# Patient Record
Sex: MALE | Race: WHITE | Employment: OTHER | URBAN - METROPOLITAN AREA
[De-identification: names, ages, dates, MRNs, and addresses within clinical notes are randomized per-mention and may not be internally consistent; named-entity substitution may affect disease eponyms.]

---

## 2018-10-03 ENCOUNTER — TELEPHONE (OUTPATIENT)
Dept: ONCOLOGY | Age: 83
End: 2018-10-03

## 2018-10-03 ENCOUNTER — HOSPITAL ENCOUNTER (OUTPATIENT)
Age: 83
Setting detail: SPECIMEN
Discharge: HOME | End: 2018-10-03
Payer: MEDICARE

## 2018-10-03 ENCOUNTER — HOSPITAL ENCOUNTER (OUTPATIENT)
Dept: INFUSION THERAPY | Age: 83
Discharge: HOME | End: 2018-10-03
Payer: MEDICARE

## 2018-10-03 ENCOUNTER — OFFICE VISIT (OUTPATIENT)
Dept: ONCOLOGY | Age: 83
End: 2018-10-03
Payer: MEDICARE

## 2018-10-03 VITALS
SYSTOLIC BLOOD PRESSURE: 166 MMHG | HEART RATE: 60 BPM | DIASTOLIC BLOOD PRESSURE: 91 MMHG | BODY MASS INDEX: 27.25 KG/M2 | TEMPERATURE: 98.4 F | WEIGHT: 173.6 LBS | RESPIRATION RATE: 20 BRPM | HEIGHT: 67 IN

## 2018-10-03 DIAGNOSIS — C61 PROSTATE CANCER (HCC): Primary | ICD-10-CM

## 2018-10-03 LAB
ALBUMIN SERPL-MCNC: 4.2 G/DL (ref 3.5–5.2)
ALP BLD-CCNC: 77 U/L (ref 40–129)
ALT SERPL-CCNC: 14 U/L (ref 0–40)
ANION GAP SERPL CALCULATED.3IONS-SCNC: 11 MMOL/L (ref 7–16)
AST SERPL-CCNC: 25 U/L (ref 0–39)
BACTERIA: NORMAL /HPF
BASOPHILS ABSOLUTE: 0.04 E9/L (ref 0–0.2)
BASOPHILS RELATIVE PERCENT: 0.7 % (ref 0–2)
BILIRUB SERPL-MCNC: 0.4 MG/DL (ref 0–1.2)
BILIRUBIN URINE: NEGATIVE
BLOOD, URINE: ABNORMAL
BUN BLDV-MCNC: 12 MG/DL (ref 8–23)
CALCIUM SERPL-MCNC: 9.6 MG/DL (ref 8.6–10.2)
CHLORIDE BLD-SCNC: 106 MMOL/L (ref 98–107)
CLARITY: CLEAR
CO2: 27 MMOL/L (ref 22–29)
COLOR: YELLOW
CREAT SERPL-MCNC: 0.9 MG/DL (ref 0.7–1.2)
EOSINOPHILS ABSOLUTE: 0.06 E9/L (ref 0.05–0.5)
EOSINOPHILS RELATIVE PERCENT: 1 % (ref 0–6)
EPITHELIAL CELLS, UA: NORMAL /HPF
GFR AFRICAN AMERICAN: >60
GFR NON-AFRICAN AMERICAN: >60 ML/MIN/1.73
GLUCOSE BLD-MCNC: 95 MG/DL (ref 74–109)
GLUCOSE URINE: NEGATIVE MG/DL
HCT VFR BLD CALC: 45.5 % (ref 37–54)
HEMOGLOBIN: 14.5 G/DL (ref 12.5–16.5)
IMMATURE GRANULOCYTES #: 0.01 E9/L
IMMATURE GRANULOCYTES %: 0.2 % (ref 0–5)
KETONES, URINE: NEGATIVE MG/DL
LEUKOCYTE ESTERASE, URINE: NEGATIVE
LYMPHOCYTES ABSOLUTE: 1.45 E9/L (ref 1.5–4)
LYMPHOCYTES RELATIVE PERCENT: 24.7 % (ref 20–42)
MCH RBC QN AUTO: 28.7 PG (ref 26–35)
MCHC RBC AUTO-ENTMCNC: 31.9 % (ref 32–34.5)
MCV RBC AUTO: 89.9 FL (ref 80–99.9)
MONOCYTES ABSOLUTE: 0.45 E9/L (ref 0.1–0.95)
MONOCYTES RELATIVE PERCENT: 7.7 % (ref 2–12)
NEUTROPHILS ABSOLUTE: 3.85 E9/L (ref 1.8–7.3)
NEUTROPHILS RELATIVE PERCENT: 65.7 % (ref 43–80)
NITRITE, URINE: NEGATIVE
PDW BLD-RTO: 13 FL (ref 11.5–15)
PH UA: 6 (ref 5–9)
PLATELET # BLD: 162 E9/L (ref 130–450)
PMV BLD AUTO: 10.6 FL (ref 7–12)
POTASSIUM SERPL-SCNC: 5.1 MMOL/L (ref 3.5–5)
PROSTATE SPECIFIC ANTIGEN: <0.01 NG/ML (ref 0–4)
PROTEIN UA: NEGATIVE MG/DL
RBC # BLD: 5.06 E12/L (ref 3.8–5.8)
RBC UA: NORMAL /HPF (ref 0–2)
SODIUM BLD-SCNC: 144 MMOL/L (ref 132–146)
SPECIFIC GRAVITY UA: 1.02 (ref 1–1.03)
TOTAL PROTEIN: 7.1 G/DL (ref 6.4–8.3)
UROBILINOGEN, URINE: 0.2 E.U./DL
WBC # BLD: 5.9 E9/L (ref 4.5–11.5)
WBC UA: NORMAL /HPF (ref 0–5)

## 2018-10-03 PROCEDURE — 36415 COLL VENOUS BLD VENIPUNCTURE: CPT

## 2018-10-03 PROCEDURE — 85025 COMPLETE CBC W/AUTO DIFF WBC: CPT

## 2018-10-03 PROCEDURE — 87088 URINE BACTERIA CULTURE: CPT

## 2018-10-03 PROCEDURE — 84153 ASSAY OF PSA TOTAL: CPT

## 2018-10-03 PROCEDURE — 81001 URINALYSIS AUTO W/SCOPE: CPT

## 2018-10-03 PROCEDURE — 80053 COMPREHEN METABOLIC PANEL: CPT

## 2018-10-03 PROCEDURE — 99214 OFFICE O/P EST MOD 30 MIN: CPT | Performed by: INTERNAL MEDICINE

## 2018-10-03 RX ORDER — PNV NO.95/FERROUS FUM/FOLIC AC 28MG-0.8MG
1 TABLET ORAL DAILY
COMMUNITY
End: 2021-02-02

## 2018-10-03 NOTE — PROGRESS NOTES
Met with patient during his initial consult with Dr. Jack Keita for a history of prostate cancer from 2003. Introduced nurse navigator role, gave contact information and informed of other supportive services in clinic: , nutrition, ACS patient navigator and financial navigator. Patient is , is the caregiver for his wife, is supported by his children and denied issues with living arrangements or transportation. He said he has [employer], will bring his card with him at his next appointment and denied insurance and prescription coverage. He reported his appetite as good, denying weight loss. Informed him he'll receive his clinic calendar in the mail and will see Dr. Jack Keita at the beginning of November and will be contacted with an appointment for his renal ultrasound. Patient denied any needs today, encouraged him to call should any arise, he verbalized understanding. Radha Pimentel RN, ADN, BSE, OCN Patient Nurse Navigator

## 2018-10-03 NOTE — PROGRESS NOTES
[address]  [address] [place] [address]   Hematology/Oncology  Consult      Patient Name: Esequiel Suarez  YOB: 1935  PCP: Susannah Rebolledo MD   Referring Provider:      Reason for Consultation:   Chief Complaint   Patient presents with    New Patient    Prostate Cancer        History of Present Illness: This patient was referred to Dr. Erik Bloom by Dr. Jeanie Liriano for follow-up for his prostate cancer. He was diagnosed in 2003 with prostate cancer and underwent Radiation seeds at [facility].  He follows annually with Dr. Jeanie Liriano for PSAs;  his last PSA done on 7/27/18 was < 0.014. He had been followed previously by Dr. Girish Partida and more recently by Dr. Hieu Tyler back in 2013    He states that on a recent urinalysis he was noted to have microscopic hematuria. He is fairly asymptomatic and feels fine and he reports nocturia but has no other urinary symptoms. No flank pain or weight loss. No back pain. He takes monoclonal antibody for psoriasis which has helped him but is not taking any blood thinners      Diagnostic Data:     Past Medical History:   Diagnosis Date    Cancer [facility]) 2003    prostate       There are no active problems to display for this patient. History reviewed. No pertinent surgical history. Family History  History reviewed. No pertinent family history. Social History    TOBACCO:   has no tobacco history on file. ETOH:   has no alcohol history on file. Home Medications  Prior to Admission medications    Medication Sig Start Date End Date Taking?  Authorizing Provider   Omeprazole (PRILOSEC PO) Take 1 capsule by mouth daily as needed   Yes Historical Provider, MD   Cyanocobalamin (VITAMIN B 12 PO) Take 1 tablet by mouth daily   Yes Historical Provider, MD   Ferrous Sulfate (IRON) 325 (65 Fe) MG TABS Take 1 tablet by mouth daily   Yes Historical Provider, MD   Omega-3 Fatty Acids (OMEGA-3 FISH OIL PO) Take 1 capsule by mouth daily   Yes Historical Provider, MD   Ustekinumab (STELARA SC) Inject 1 Syringe into the skin every 3 months   Yes Historical Provider, MD       Allergies  No Known Allergies    Review of Systems:    Constitutional:  No fever chills or rigors. Eyes: No changes in vision, discharge, or pain  ENT: No Headaches, hearing loss or vertigo. No mouth sores or sore throat. No change in taste or smell. Cardiovascular: No chest discomfort, dyspnea on exertion, palpitations or loss of consciousness. or phlebitis. Respiratory: Has no cough or wheezing, Has no sputum production. Has no hemoptysis, Has no pleuritic pain, . Gastrointestinal: No abdominal pain, appetite loss, blood in stools. No change in bowel habits. No hematemesis   Genitourinary: Patient acknowledges no dysuria, trouble voiding, or hematuria. No nocturia or increased frequency. Musculoskeletal: No gait disturbance, weakness or joint complaints. Integumentary: No rash or pruritis. Neurological: No headache, diplopia, change in muscle strength, numbness or tingling. No change in gait, balance, coordination, mood, affect, memory, mentation, behavior. Psychiatric: No anxiety, or depression. Endocrine: No temperature intolerance. No excessive thirst, fluid intake, or urination. No tremor. Hematologic/Lymphatic: No abnormal bruising or bleeding, blood clots or swollen lymph nodes. Allergic/Immunologic: No nasal congestion or hives. Objective  BP (!) 166/91 (Site: Left Upper Arm, Position: Sitting, Cuff Size: Medium Adult) Comment: Machine pumped twice. Pulse 60   Temp 98.4 °F (36.9 °C) (Temporal)   Resp 20   Ht 5' 7\" (1.702 m)   Wt 173 lb 9.6 oz (78.7 kg)   BMI 27.19 kg/m²   Physical Exam:   Performance Status: 0  General: AAO to person, place, time, in no acute distress, pleasant   Head and neck : PERRLA, EOMI . Sclera non icteric.   Oropharynx : Clear  Neck: no JVD,  no adenopathy,     Heart: Regular rate and regular rhythm, no murmur  Lungs: Clear to auscultation   Extremities: No edema,no cyanosis, no clubbing. Abdomen: Soft, non-tender;no masses, no organomegaly  Skin:  Well-healed psoriatic rash  Neurologic:Cranial nerves grossly intact. No focal motor or sensory deficits . Recent Laboratory Data-   Lab Results   Component Value Date    WBC 5.9 10/03/2018    HGB 14.5 10/03/2018    HCT 45.5 10/03/2018    MCV 89.9 10/03/2018     10/03/2018    LYMPHOPCT 24.7 10/03/2018    RBC 5.06 10/03/2018    MCH 28.7 10/03/2018    MCHC 31.9 (L) 10/03/2018    RDW 13.0 10/03/2018    NEUTOPHILPCT 65.7 10/03/2018    MONOPCT 7.7 10/03/2018    BASOPCT 0.7 10/03/2018    NEUTROABS 3.85 10/03/2018    LYMPHSABS 1.45 (L) 10/03/2018    MONOSABS 0.45 10/03/2018    EOSABS 0.06 10/03/2018    BASOSABS 0.04 10/03/2018       Lab Results   Component Value Date     10/03/2018    K 5.1 (H) 10/03/2018     10/03/2018    CO2 27 10/03/2018    BUN 12 10/03/2018    CREATININE 0.9 10/03/2018    GLUCOSE 95 10/03/2018    CALCIUM 9.6 10/03/2018    PROT 7.1 10/03/2018    LABALBU 4.2 10/03/2018    BILITOT 0.4 10/03/2018    ALKPHOS 77 10/03/2018    AST 25 10/03/2018    ALT 14 10/03/2018    LABGLOM >60 10/03/2018    GFRAA >60 10/03/2018       Lab Results   Component Value Date    IRON 98 03/04/2015    TIBC 368 03/25/2013    FERRITIN 46 03/04/2015           Radiology-  No results found. ASSESSMENT/PLAN :  49-year-old man with a remote history of early stage prostate cancer status post radiation seeds at [facility] in 2003 his PSA has remained very low and close to undetectable. Apparently he was noted to have microscopic hematuria. He was last seen by urology in 2013    CBC CMP and PSA will be repeated today along with a urinalysis and urine culture and ultrasound of kidneys abdomen and retroperitoneum will be reviewed      Sonya Rosales. Viral Oliveros M.D., F.A.C.P.   Electronically signed 10/3/2018 at 1:53 PM

## 2018-10-03 NOTE — PROGRESS NOTES
Nancy Lance  1935 80 y.o. Referring Physician:     Samuel Almaraz MD :    Vitals:    10/03/18 1156   BP: (!) 166/91   Pulse: 60   Resp: 20   Temp: 98.4 °F (36.9 °C)    :     :    Body mass index is 27.19 kg/m². Chief Complaint:   Chief Complaint   Patient presents with    New Patient    Prostate Cancer       Cancer Staging  No matching staging information was found for the patient. Prior Radiation Therapy? yes   If yes, site treated: prostate  [facility]                       Date:    Concurrent Chemo/radiation? no   If yes, start date:    Prior Chemotherapy? no   If yes, site treated:   Facility:                             Date:    Prior Hormonal Therapy? Yes, patient stated he received only 1 injection, but then was told he didn't need them anymore. If yes, site treated:   Facility:   Critical access hospital [facility]                          FBWS:     LMP:na    Age at first Menses: na    :na    Para:na              Current Outpatient Prescriptions:     Omeprazole (PRILOSEC PO), Take 1 capsule by mouth daily as needed, Disp: , Rfl:     Cyanocobalamin (VITAMIN B 12 PO), Take 1 tablet by mouth daily, Disp: , Rfl:     Ferrous Sulfate (IRON) 325 (65 Fe) MG TABS, Take 1 tablet by mouth daily, Disp: , Rfl:     Omega-3 Fatty Acids (OMEGA-3 FISH OIL PO), Take 1 capsule by mouth daily, Disp: , Rfl:     Ustekinumab (STELARA SC), Inject 1 Syringe into the skin every 3 months, Disp: , Rfl:        Past Medical History:   Diagnosis Date    [facility]     [facility]       History reviewed. No pertinent surgical history. History reviewed. No pertinent family history. Social History     Social History    Marital status:      Spouse name: N/A    Number of children: N/A    Years of education: N/A     Occupational History    Not on file.      Social History Main Topics    Smoking status: Not on file    Smokeless tobacco: Not on file    Alcohol use Not on malnutrition                  >/=2 at risk of malnutrition, see below           Score of 0-1: No action  Score 2 or greater:  1. For Non-Diabetic Patient: Recommend adding Ensure Complete 2xdaily and provide              patient with Ensure wellness bag with coupons; For Diabetic Patient, Recommend adding Glucerna Shake 2xdaily and provide patient with Glucerna Wellness bag with coupons  Route to the dietitian via [address] [place] [address]    · Are you having  difficulty performing daily routine tasks  due to fatigue or weakness (ie: bathing/showering, dressing, housework, meal prep, work, child Víctor Pilsner): no    · Do you have any arm flexibility/ROM restrictions, swelling or pain that limit activity:no    · Any changes in memory, attention/focus that impact daily activities:  no    · Do you avoid participation in leisure/social activity due weakness, fatigue or pain:no    IF ANY OF THE ABOVE ARE ANSWERED YES:   a. Referral request for OT sent to NP? No   · If NO, then why: patient doesn't meet criteria   b. NP Name:na        PT ASSESSMENT FOR REFERRAL    · Have you had any recent falls in past 2 months:no    · Do you have difficulty  going up/down stairs:no    · Are you having difficulty walking: no    · Do you often hold onto furniture/environmental supports or feel off balance when you are walking: no    · Do you need to take rest breaks when you are walking: no    · Any pain on scale of 1-10 that limits your mobility:no, 0/10    IF ANY OF THE ABOVE ARE ANSWERED YES:   a. Referral request for PT sent to NP? no  · If NO, then why: patient doesn't meet criteria   b. NP Name:       Distress Screening Assessment   1. Completed by the patient? Form not available for patient today  2. Reviewed by RN? na   3. Triggers met for immediate intervention (score of 6 or more)? na, patient denied having any needs listed on distress screening. If Yes: a. What intervention provided: na    b.  Referral made to

## 2018-10-05 LAB — URINE CULTURE, ROUTINE: NORMAL

## 2018-11-05 ENCOUNTER — HOSPITAL ENCOUNTER (OUTPATIENT)
Dept: ULTRASOUND IMAGING | Age: 83
Discharge: HOME | End: 2018-11-05
Payer: MEDICARE

## 2018-11-05 DIAGNOSIS — C61 PROSTATE CANCER (HCC): ICD-10-CM

## 2018-11-05 PROCEDURE — 76775 US EXAM ABDO BACK WALL LIM: CPT

## 2018-11-07 ENCOUNTER — HOSPITAL ENCOUNTER (OUTPATIENT)
Dept: INFUSION THERAPY | Age: 83
Discharge: HOME | End: 2018-11-07
Payer: MEDICARE

## 2018-11-07 ENCOUNTER — OFFICE VISIT (OUTPATIENT)
Dept: ONCOLOGY | Age: 83
End: 2018-11-07
Payer: MEDICARE

## 2018-11-07 VITALS
DIASTOLIC BLOOD PRESSURE: 109 MMHG | TEMPERATURE: 98.2 F | WEIGHT: 173.5 LBS | SYSTOLIC BLOOD PRESSURE: 172 MMHG | RESPIRATION RATE: 20 BRPM | HEIGHT: 67 IN | BODY MASS INDEX: 27.23 KG/M2

## 2018-11-07 DIAGNOSIS — C61 PROSTATE CANCER (HCC): ICD-10-CM

## 2018-11-07 PROCEDURE — 99212 OFFICE O/P EST SF 10 MIN: CPT

## 2018-11-07 NOTE — PROGRESS NOTES
RBCs  Urine culture was negative. Ultrasound of the kidneys showed benign-appearing renal cysts. with very mild hydronephrosis  PSA was less than 0.01    His mild left-sided hydronephrosis is likely related to postradiation scarring. He has been reassured. Dwayne Honeycutt. Sushila Ivory M.D., F.A.C.P.   Electronically signed 11/7/2018 at 1:01 PM

## 2019-07-03 LAB
AVERAGE GLUCOSE: 108
CHOLESTEROL, TOTAL: 164 MG/DL
CHOLESTEROL/HDL RATIO: 2.5
CREATININE: 1 MG/DL
HBA1C MFR BLD: 5.4 %
HDLC SERPL-MCNC: 66 MG/DL (ref 35–70)
LDL CHOLESTEROL CALCULATED: 85 MG/DL (ref 0–160)
POTASSIUM (K+): 4.9
PSA, ULTRASENSITIVE: <0.01
TRIGL SERPL-MCNC: 66 MG/DL
VLDLC SERPL CALC-MCNC: 13 MG/DL

## 2019-08-27 ENCOUNTER — HOSPITAL ENCOUNTER (OUTPATIENT)
Dept: GENERAL RADIOLOGY | Age: 84
Discharge: HOME | End: 2019-08-29
Payer: MEDICARE

## 2019-08-27 ENCOUNTER — HOSPITAL ENCOUNTER (OUTPATIENT)
Age: 84
Discharge: HOME | End: 2019-08-29
Payer: MEDICARE

## 2019-08-27 DIAGNOSIS — R52 PAIN: ICD-10-CM

## 2019-08-27 PROCEDURE — 73130 X-RAY EXAM OF HAND: CPT

## 2019-11-06 ENCOUNTER — OFFICE VISIT (OUTPATIENT)
Dept: ONCOLOGY | Age: 84
End: 2019-11-06
Payer: MEDICARE

## 2019-11-06 ENCOUNTER — HOSPITAL ENCOUNTER (OUTPATIENT)
Dept: INFUSION THERAPY | Age: 84
Discharge: HOME | End: 2019-11-06
Payer: MEDICARE

## 2019-11-06 VITALS
OXYGEN SATURATION: 96 % | HEART RATE: 86 BPM | SYSTOLIC BLOOD PRESSURE: 135 MMHG | HEIGHT: 67 IN | WEIGHT: 167 LBS | DIASTOLIC BLOOD PRESSURE: 99 MMHG | TEMPERATURE: 97.6 F | BODY MASS INDEX: 26.21 KG/M2

## 2019-11-06 DIAGNOSIS — C61 PROSTATE CANCER (HCC): Primary | ICD-10-CM

## 2019-11-06 DIAGNOSIS — C61 PROSTATE CANCER (HCC): ICD-10-CM

## 2019-11-06 LAB
ALBUMIN SERPL-MCNC: 4 G/DL (ref 3.5–5.2)
ALP BLD-CCNC: 90 U/L (ref 40–129)
ALT SERPL-CCNC: 10 U/L (ref 0–40)
ANION GAP SERPL CALCULATED.3IONS-SCNC: 10 MMOL/L (ref 7–16)
AST SERPL-CCNC: 20 U/L (ref 0–39)
BACTERIA: NORMAL /HPF
BASOPHILS ABSOLUTE: 0.05 E9/L (ref 0–0.2)
BASOPHILS RELATIVE PERCENT: 0.7 % (ref 0–2)
BILIRUB SERPL-MCNC: 0.5 MG/DL (ref 0–1.2)
BILIRUBIN URINE: NEGATIVE
BLOOD, URINE: ABNORMAL
BUN BLDV-MCNC: 12 MG/DL (ref 8–23)
CALCIUM SERPL-MCNC: 9.4 MG/DL (ref 8.6–10.2)
CHLORIDE BLD-SCNC: 104 MMOL/L (ref 98–107)
CLARITY: CLEAR
CO2: 26 MMOL/L (ref 22–29)
COLOR: YELLOW
CREAT SERPL-MCNC: 1.1 MG/DL (ref 0.7–1.2)
EOSINOPHILS ABSOLUTE: 0.05 E9/L (ref 0.05–0.5)
EOSINOPHILS RELATIVE PERCENT: 0.7 % (ref 0–6)
EPITHELIAL CELLS, UA: NORMAL /HPF
GFR AFRICAN AMERICAN: >60
GFR NON-AFRICAN AMERICAN: >60 ML/MIN/1.73
GLUCOSE BLD-MCNC: 92 MG/DL (ref 74–99)
GLUCOSE URINE: NEGATIVE MG/DL
HCT VFR BLD CALC: 39.9 % (ref 37–54)
HEMOGLOBIN: 12.9 G/DL (ref 12.5–16.5)
IMMATURE GRANULOCYTES #: 0.02 E9/L
IMMATURE GRANULOCYTES %: 0.3 % (ref 0–5)
KETONES, URINE: NEGATIVE MG/DL
LEUKOCYTE ESTERASE, URINE: NEGATIVE
LYMPHOCYTES ABSOLUTE: 1.03 E9/L (ref 1.5–4)
LYMPHOCYTES RELATIVE PERCENT: 15.4 % (ref 20–42)
MCH RBC QN AUTO: 29.2 PG (ref 26–35)
MCHC RBC AUTO-ENTMCNC: 32.3 % (ref 32–34.5)
MCV RBC AUTO: 90.3 FL (ref 80–99.9)
MONOCYTES ABSOLUTE: 0.5 E9/L (ref 0.1–0.95)
MONOCYTES RELATIVE PERCENT: 7.5 % (ref 2–12)
NEUTROPHILS ABSOLUTE: 5.03 E9/L (ref 1.8–7.3)
NEUTROPHILS RELATIVE PERCENT: 75.4 % (ref 43–80)
NITRITE, URINE: NEGATIVE
PDW BLD-RTO: 13.1 FL (ref 11.5–15)
PH UA: 6 (ref 5–9)
PLATELET # BLD: 181 E9/L (ref 130–450)
PMV BLD AUTO: 10.3 FL (ref 7–12)
POTASSIUM SERPL-SCNC: 4 MMOL/L (ref 3.5–5)
PROSTATE SPECIFIC ANTIGEN: <0.01 NG/ML (ref 0–4)
PROTEIN UA: NEGATIVE MG/DL
RBC # BLD: 4.42 E12/L (ref 3.8–5.8)
RBC UA: NORMAL /HPF (ref 0–2)
SODIUM BLD-SCNC: 140 MMOL/L (ref 132–146)
SPECIFIC GRAVITY UA: 1.01 (ref 1–1.03)
TOTAL PROTEIN: 6.9 G/DL (ref 6.4–8.3)
UROBILINOGEN, URINE: 0.2 E.U./DL
WBC # BLD: 6.7 E9/L (ref 4.5–11.5)
WBC UA: NORMAL /HPF (ref 0–5)

## 2019-11-06 PROCEDURE — 36415 COLL VENOUS BLD VENIPUNCTURE: CPT

## 2019-11-06 PROCEDURE — 84153 ASSAY OF PSA TOTAL: CPT

## 2019-11-06 PROCEDURE — 85025 COMPLETE CBC W/AUTO DIFF WBC: CPT

## 2019-11-06 PROCEDURE — 99212 OFFICE O/P EST SF 10 MIN: CPT | Performed by: INTERNAL MEDICINE

## 2019-11-06 PROCEDURE — 81001 URINALYSIS AUTO W/SCOPE: CPT

## 2019-11-06 PROCEDURE — 80053 COMPREHEN METABOLIC PANEL: CPT

## 2020-06-03 VITALS
SYSTOLIC BLOOD PRESSURE: 120 MMHG | RESPIRATION RATE: 14 BRPM | HEART RATE: 62 BPM | WEIGHT: 165 LBS | BODY MASS INDEX: 25.84 KG/M2 | DIASTOLIC BLOOD PRESSURE: 80 MMHG

## 2020-06-03 RX ORDER — OMEPRAZOLE 20 MG/1
20 CAPSULE, DELAYED RELEASE ORAL DAILY
COMMUNITY
End: 2021-02-02

## 2020-06-03 RX ORDER — OXYBUTYNIN CHLORIDE 5 MG/1
5 TABLET, EXTENDED RELEASE ORAL DAILY
COMMUNITY
End: 2021-02-02 | Stop reason: SDUPTHER

## 2020-06-03 RX ORDER — ERGOCALCIFEROL 1.25 MG/1
50000 CAPSULE ORAL WEEKLY
COMMUNITY
End: 2021-09-14

## 2020-06-09 LAB
AVERAGE GLUCOSE: 111
CHOLESTEROL, TOTAL: 162 MG/DL
CHOLESTEROL/HDL RATIO: 2
CREATININE: 0.9 MG/DL
HBA1C MFR BLD: 5.5 %
HDLC SERPL-MCNC: 85 MG/DL (ref 35–70)
LDL CHOLESTEROL CALCULATED: 66 MG/DL (ref 0–160)
POTASSIUM (K+): 4.3
TRIGL SERPL-MCNC: 54 MG/DL
VLDLC SERPL CALC-MCNC: ABNORMAL MG/DL

## 2021-02-02 ENCOUNTER — OFFICE VISIT (OUTPATIENT)
Dept: FAMILY MEDICINE CLINIC | Age: 86
End: 2021-02-02
Payer: MEDICARE

## 2021-02-02 VITALS
OXYGEN SATURATION: 98 % | BODY MASS INDEX: 31.1 KG/M2 | WEIGHT: 169 LBS | HEART RATE: 98 BPM | SYSTOLIC BLOOD PRESSURE: 126 MMHG | DIASTOLIC BLOOD PRESSURE: 80 MMHG | TEMPERATURE: 97 F | HEIGHT: 62 IN

## 2021-02-02 DIAGNOSIS — M19.90 ARTHRITIS: ICD-10-CM

## 2021-02-02 DIAGNOSIS — C61 PROSTATE CANCER (HCC): ICD-10-CM

## 2021-02-02 DIAGNOSIS — E78.5 HYPERLIPIDEMIA, UNSPECIFIED HYPERLIPIDEMIA TYPE: ICD-10-CM

## 2021-02-02 DIAGNOSIS — L40.9 PSORIASIS: ICD-10-CM

## 2021-02-02 DIAGNOSIS — L40.9 PSORIASIS: Primary | ICD-10-CM

## 2021-02-02 DIAGNOSIS — D51.0 PERNICIOUS ANEMIA: ICD-10-CM

## 2021-02-02 LAB
ALBUMIN SERPL-MCNC: 4.3 G/DL (ref 3.5–5.2)
ALP BLD-CCNC: 81 U/L (ref 40–129)
ALT SERPL-CCNC: 9 U/L (ref 0–40)
ANION GAP SERPL CALCULATED.3IONS-SCNC: 14 MMOL/L (ref 7–16)
AST SERPL-CCNC: 17 U/L (ref 0–39)
BASOPHILS ABSOLUTE: 0.02 E9/L (ref 0–0.2)
BASOPHILS RELATIVE PERCENT: 0.2 % (ref 0–2)
BILIRUB SERPL-MCNC: 0.5 MG/DL (ref 0–1.2)
BUN BLDV-MCNC: 10 MG/DL (ref 8–23)
BURR CELLS: ABNORMAL
CALCIUM SERPL-MCNC: 9.6 MG/DL (ref 8.6–10.2)
CHLORIDE BLD-SCNC: 102 MMOL/L (ref 98–107)
CHOLESTEROL, TOTAL: 175 MG/DL (ref 0–199)
CO2: 24 MMOL/L (ref 22–29)
CREAT SERPL-MCNC: 0.8 MG/DL (ref 0.7–1.2)
EOSINOPHILS ABSOLUTE: 0 E9/L (ref 0.05–0.5)
EOSINOPHILS RELATIVE PERCENT: 0 % (ref 0–6)
GFR AFRICAN AMERICAN: >60
GFR NON-AFRICAN AMERICAN: >60 ML/MIN/1.73
GLUCOSE BLD-MCNC: 127 MG/DL (ref 74–99)
HCT VFR BLD CALC: 41.1 % (ref 37–54)
HDLC SERPL-MCNC: 88 MG/DL
HEMOGLOBIN: 13.1 G/DL (ref 12.5–16.5)
IMMATURE GRANULOCYTES #: 0.01 E9/L
IMMATURE GRANULOCYTES %: 0.1 % (ref 0–5)
LDL CHOLESTEROL CALCULATED: 74 MG/DL (ref 0–99)
LYMPHOCYTES ABSOLUTE: 0.49 E9/L (ref 1.5–4)
LYMPHOCYTES RELATIVE PERCENT: 6.1 % (ref 20–42)
MCH RBC QN AUTO: 27.5 PG (ref 26–35)
MCHC RBC AUTO-ENTMCNC: 31.9 % (ref 32–34.5)
MCV RBC AUTO: 86.2 FL (ref 80–99.9)
MONOCYTES ABSOLUTE: 0.24 E9/L (ref 0.1–0.95)
MONOCYTES RELATIVE PERCENT: 3 % (ref 2–12)
NEUTROPHILS ABSOLUTE: 7.28 E9/L (ref 1.8–7.3)
NEUTROPHILS RELATIVE PERCENT: 90.6 % (ref 43–80)
OVALOCYTES: ABNORMAL
PDW BLD-RTO: 14 FL (ref 11.5–15)
PLATELET # BLD: 247 E9/L (ref 130–450)
PMV BLD AUTO: 10.3 FL (ref 7–12)
POIKILOCYTES: ABNORMAL
POLYCHROMASIA: ABNORMAL
POTASSIUM SERPL-SCNC: 4.5 MMOL/L (ref 3.5–5)
PROSTATE SPECIFIC ANTIGEN: <0.03 NG/ML (ref 0–4)
RBC # BLD: 4.77 E12/L (ref 3.8–5.8)
SODIUM BLD-SCNC: 140 MMOL/L (ref 132–146)
TOTAL PROTEIN: 7.1 G/DL (ref 6.4–8.3)
TRIGL SERPL-MCNC: 65 MG/DL (ref 0–149)
TSH SERPL DL<=0.05 MIU/L-ACNC: 0.92 UIU/ML (ref 0.27–4.2)
VLDLC SERPL CALC-MCNC: 13 MG/DL
WBC # BLD: 8 E9/L (ref 4.5–11.5)

## 2021-02-02 PROCEDURE — 99213 OFFICE O/P EST LOW 20 MIN: CPT | Performed by: INTERNAL MEDICINE

## 2021-02-02 RX ORDER — OXYBUTYNIN CHLORIDE 5 MG/1
5 TABLET, EXTENDED RELEASE ORAL DAILY
Qty: 90 TABLET | Refills: 1 | Status: SHIPPED
Start: 2021-02-02 | End: 2021-05-13

## 2021-02-02 SDOH — ECONOMIC STABILITY: FOOD INSECURITY: WITHIN THE PAST 12 MONTHS, THE FOOD YOU BOUGHT JUST DIDN'T LAST AND YOU DIDN'T HAVE MONEY TO GET MORE.: NEVER TRUE

## 2021-02-02 SDOH — ECONOMIC STABILITY: INCOME INSECURITY: HOW HARD IS IT FOR YOU TO PAY FOR THE VERY BASICS LIKE FOOD, HOUSING, MEDICAL CARE, AND HEATING?: NOT ASKED

## 2021-02-02 SDOH — ECONOMIC STABILITY: FOOD INSECURITY: WITHIN THE PAST 12 MONTHS, YOU WORRIED THAT YOUR FOOD WOULD RUN OUT BEFORE YOU GOT MONEY TO BUY MORE.: NEVER TRUE

## 2021-02-02 ASSESSMENT — PATIENT HEALTH QUESTIONNAIRE - PHQ9
SUM OF ALL RESPONSES TO PHQ QUESTIONS 1-9: 0
SUM OF ALL RESPONSES TO PHQ QUESTIONS 1-9: 0
1. LITTLE INTEREST OR PLEASURE IN DOING THINGS: 0
SUM OF ALL RESPONSES TO PHQ QUESTIONS 1-9: 0

## 2021-02-02 NOTE — PROGRESS NOTES
Subjective:     Chief Complaint   Patient presents with    Joint Pain   Complains of joint pains symptoms the hands swell up patient has psoriatic arthritis disease flares up he takes some prednisone which helps    Has history of prostate cancer has not seen a urologist    Has history of pernicious anemia      Follow-up on the cholesterol    Past Medical History:   Diagnosis Date    [facility]    prostate-post seed implants 2003    Diverticulosis     GERD (gastroesophageal reflux disease)     Hemorrhoids     Hiatal hernia     Hyperlipidemia     Osteoarthritis     Psoriasis         Social History     Socioeconomic History    Marital status:      Spouse name: Not on file    Number of children: Not on file    Years of education: Not on file    Highest education level: Not on file   Occupational History    Not on file   Social Needs    Financial resource strain: Not on file    Food insecurity     Worry: Never true     Inability: Never true    Transportation needs     Medical: No     Non-medical: No   Tobacco Use    Smoking status: Former Smoker     Packs/day: 0.25     Years: 5.00     Pack years: 1.25     Types: Cigarettes     Quit date: 2000     Years since quittin.1    Smokeless tobacco: Never Used   Substance and Sexual Activity    Alcohol use:  Yes     Alcohol/week: 21.0 standard drinks     Types: 21 Cans of beer per week    Drug use: No    Sexual activity: Not on file   Lifestyle    Physical activity     Days per week: Not on file     Minutes per session: Not on file    Stress: Not on file   Relationships    Social connections     Talks on phone: Not on file     Gets together: Not on file     Attends [employer] service: Not on file     Active member of club or organization: Not on file     Attends meetings of clubs or organizations: Not on file     Relationship status: Not on file    Intimate partner violence     Fear of current or ex partner: Not on file Emotionally abused: Not on file     Physically abused: Not on file     Forced sexual activity: Not on file   Other Topics Concern    Not on file   Social History Narrative    Not on file        Past Surgical History:   Procedure Laterality Date    CARDIAC CATHETERIZATION  03/2007    COLONOSCOPY  08/2011        Family History   Problem Relation Age of Onset    No Known Problems Mother     Heart Attack Father     Heart Disease Father     No Known Problems Sister     No Known Problems Brother     No Known Problems Maternal Aunt     Heart Attack Maternal Uncle     No Known Problems Paternal Aunt     No Known Problems Paternal Uncle     No Known Problems Maternal Grandmother     No Known Problems Maternal Grandfather     No Known Problems Paternal Grandmother     No Known Problems Paternal Grandfather     No Known Problems Maternal Cousin     No Known Problems Paternal Cousin     Diabetes Grandchild     No Known Problems Sister     Cancer Sister         breast    Heart Disease Sister     Heart Attack Sister     Other Brother         collapsed lung    Cancer Brother         patient doesn't know type    Alzheimer's Disease Brother         No Known Allergies     ROS  No acute distress  Cardiac: Denies any chest pain or palpitation had a stress test with Dr. Anne-Marie Lane 5 years ago was negative  Respiratory: Denies any cough or shortness of breath  GI: No abdominal pain.  Denies any nausea vomiting or diarrhea  : Denies any dysuria frequency or hematuria has occasional urgency of urination oxybutynin helps  Neuro: No headache or dizziness  Endocrine: No diabetes  Skin: Has psoriatic rash  No recent weight gain or weight loss  Denies any change in vision  Generalized joint pain more in the hands  Objective:    /80   Pulse 98   Temp 97 °F (36.1 °C)   Ht 5' 2\" (1.575 m)   Wt 169 lb (76.7 kg)   SpO2 98%   BMI 30.91 kg/m²     Constitutional: Alert awake and oriented Eyes: Pupils equal bilaterally. Extraocular muscles intact  Neck: no JVD adenopathy no bruit  Heart:  RRR, no murmurs, gallops, or rubs. Lungs:    no wheeze, rales or rhonchi  Abd: bowel sounds present, nontender, nondistended, no masses  Extrem:  No clubbing, cyanosis, or edema  Neuro: AAOx3,No Focal deficit  Psychological: no depression or anxiety       Current Outpatient Medications   Medication Sig Dispense Refill    oxybutynin (DITROPAN-XL) 5 MG extended release tablet Take 1 tablet by mouth daily 90 tablet 1    vitamin D (ERGOCALCIFEROL) 1.25 MG (86537 UT) CAPS capsule Take 50,000 Units by mouth once a week      Omega-3 Fatty Acids (OMEGA-3 FISH OIL PO) Take 1 capsule by mouth daily      Ustekinumab (STELARA SC) Inject 1 Syringe into the skin every 3 months       No current facility-administered medications for this visit.          Last 3 BMP  Lab Results   Component Value Date/Time     11/06/2019 01:46 PM     10/03/2018 12:48 PM     03/04/2015 08:41 AM    K 4.3 06/09/2020    K 4.0 11/06/2019 01:46 PM    K 4.9 07/03/2019    K 5.1 (H) 10/03/2018 12:48 PM    K 4.2 03/04/2015 08:41 AM     11/06/2019 01:46 PM     10/03/2018 12:48 PM     03/04/2015 08:41 AM    CO2 26 11/06/2019 01:46 PM    CO2 27 10/03/2018 12:48 PM    CO2 25 03/04/2015 08:41 AM    BUN 12 11/06/2019 01:46 PM    BUN 12 10/03/2018 12:48 PM    BUN 14 03/04/2015 08:41 AM    CREATININE 0.9 06/09/2020    CREATININE 1.1 11/06/2019 01:46 PM    CREATININE 1.0 07/03/2019    CREATININE 0.9 10/03/2018 12:48 PM    CREATININE 0.9 03/04/2015 08:41 AM    GLUCOSE 92 11/06/2019 01:46 PM    GLUCOSE 95 10/03/2018 12:48 PM    GLUCOSE 89 03/04/2015 08:41 AM    GLUCOSE 99 03/23/2012 08:45 AM    CALCIUM 9.4 11/06/2019 01:46 PM    CALCIUM 9.6 10/03/2018 12:48 PM    CALCIUM 9.0 03/04/2015 08:41 AM       Last 3 CMP:    Lab Results   Component Value Date/Time     11/06/2019 01:46 PM     10/03/2018 12:48 PM  03/04/2015 08:41 AM    K 4.3 06/09/2020    K 4.0 11/06/2019 01:46 PM    K 4.9 07/03/2019    K 5.1 (H) 10/03/2018 12:48 PM    K 4.2 03/04/2015 08:41 AM     11/06/2019 01:46 PM     10/03/2018 12:48 PM     03/04/2015 08:41 AM    CO2 26 11/06/2019 01:46 PM    CO2 27 10/03/2018 12:48 PM    CO2 25 03/04/2015 08:41 AM    BUN 12 11/06/2019 01:46 PM    BUN 12 10/03/2018 12:48 PM    BUN 14 03/04/2015 08:41 AM    CREATININE 0.9 06/09/2020    CREATININE 1.1 11/06/2019 01:46 PM    CREATININE 1.0 07/03/2019    CREATININE 0.9 10/03/2018 12:48 PM    CREATININE 0.9 03/04/2015 08:41 AM    GLUCOSE 92 11/06/2019 01:46 PM    GLUCOSE 95 10/03/2018 12:48 PM    GLUCOSE 89 03/04/2015 08:41 AM    GLUCOSE 99 03/23/2012 08:45 AM    CALCIUM 9.4 11/06/2019 01:46 PM    CALCIUM 9.6 10/03/2018 12:48 PM    CALCIUM 9.0 03/04/2015 08:41 AM    PROT 6.9 11/06/2019 01:46 PM    PROT 7.1 10/03/2018 12:48 PM    PROT 6.5 03/04/2015 08:41 AM    LABALBU 4.0 11/06/2019 01:46 PM    LABALBU 4.2 10/03/2018 12:48 PM    LABALBU 3.9 03/04/2015 08:41 AM    LABALBU 4.2 03/23/2012 08:45 AM    BILITOT 0.5 11/06/2019 01:46 PM    BILITOT 0.4 10/03/2018 12:48 PM    BILITOT 0.5 03/04/2015 08:41 AM    ALKPHOS 90 11/06/2019 01:46 PM    ALKPHOS 77 10/03/2018 12:48 PM    ALKPHOS 74 03/04/2015 08:41 AM    AST 20 11/06/2019 01:46 PM    AST 25 10/03/2018 12:48 PM    AST 21 03/04/2015 08:41 AM    ALT 10 11/06/2019 01:46 PM    ALT 14 10/03/2018 12:48 PM    ALT 18 03/04/2015 08:41 AM        CBC:   Lab Results   Component Value Date/Time    WBC 6.7 11/06/2019 01:46 PM    RBC 4.42 11/06/2019 01:46 PM    HGB 12.9 11/06/2019 01:46 PM    HCT 39.9 11/06/2019 01:46 PM    MCV 90.3 11/06/2019 01:46 PM    MCH 29.2 11/06/2019 01:46 PM    MCHC 32.3 11/06/2019 01:46 PM    RDW 13.1 11/06/2019 01:46 PM     11/06/2019 01:46 PM    MPV 10.3 11/06/2019 01:46 PM       A1C:  Lab Results   Component Value Date/Time    LABA1C 5.5 06/09/2020       Lipid panel:  Lab Results Component Value Date    CHOL 162 06/09/2020    CHOL 164 07/03/2019    CHOL 198 03/04/2015    TRIG 54 06/09/2020    TRIG 66 07/03/2019    TRIG 93 03/04/2015    HDL 85 06/09/2020    HDL 66 07/03/2019    HDL 60 03/04/2015        Lab Results   Component Value Date/Time    PROT 6.9 11/06/2019 01:46 PM    PROT 7.1 10/03/2018 12:48 PM    PROT 6.5 03/04/2015 08:41 AM       No results found for: MG      Assessment. Antoine Calvert was seen today for joint pain. Diagnoses and all orders for this visit:    Psoriasis  -     CBC Auto Differential; Future    Arthritis  -     CBC Auto Differential; Future    Hyperlipidemia, unspecified hyperlipidemia type  -     Comprehensive Metabolic Panel; Future  -     Lipid Panel; Future  -     TSH without Reflex; Future    Prostate cancer ([facility]  -     Psa screening; Future    Other orders  -     oxybutynin (DITROPAN-XL) 5 MG extended release tablet; Take 1 tablet by mouth daily       Patient Active Problem List   Diagnosis    [facility]    Psoriasis    Arthritis       Plan: Low-cholesterol low-fat diet check lipid profile    Check PSA history of prostate cancer    Psoriatic arthritis swelling of the hand joints take prednisone for a week then taper off    History of pernicious anemia check B12 level      GE reflux is doing better    He had a colonoscopy August 2011   Follow recommendation from the GI    Oxybutynin for urge incontinence    He had a cardiac catheterization 2007 which was negative    Lifestyle diet modification discussed  Return in about 3 months (around 5/2/2021).        Isaac Lennon MD  12:13 PM  2/2/2021     DE

## 2021-04-30 ENCOUNTER — TELEPHONE (OUTPATIENT)
Dept: FAMILY MEDICINE CLINIC | Age: 86
End: 2021-04-30

## 2021-05-13 ENCOUNTER — OFFICE VISIT (OUTPATIENT)
Dept: FAMILY MEDICINE CLINIC | Age: 86
End: 2021-05-13
Payer: MEDICARE

## 2021-05-13 VITALS
BODY MASS INDEX: 29.81 KG/M2 | SYSTOLIC BLOOD PRESSURE: 120 MMHG | WEIGHT: 163 LBS | HEART RATE: 115 BPM | TEMPERATURE: 98 F | DIASTOLIC BLOOD PRESSURE: 80 MMHG | OXYGEN SATURATION: 98 %

## 2021-05-13 DIAGNOSIS — D51.0 PERNICIOUS ANEMIA: ICD-10-CM

## 2021-05-13 DIAGNOSIS — L40.9 PSORIASIS: ICD-10-CM

## 2021-05-13 DIAGNOSIS — G62.9 NEUROPATHY: ICD-10-CM

## 2021-05-13 DIAGNOSIS — C61 PROSTATE CANCER (HCC): ICD-10-CM

## 2021-05-13 DIAGNOSIS — E78.5 HYPERLIPIDEMIA, UNSPECIFIED HYPERLIPIDEMIA TYPE: ICD-10-CM

## 2021-05-13 DIAGNOSIS — L40.50 PSORIATIC ARTHRITIS (HCC): ICD-10-CM

## 2021-05-13 DIAGNOSIS — L40.50 PSORIATIC ARTHRITIS (HCC): Primary | ICD-10-CM

## 2021-05-13 LAB
ALBUMIN SERPL-MCNC: 4.4 G/DL (ref 3.5–5.2)
ALP BLD-CCNC: 78 U/L (ref 40–129)
ALT SERPL-CCNC: 9 U/L (ref 0–40)
ANION GAP SERPL CALCULATED.3IONS-SCNC: 18 MMOL/L (ref 7–16)
AST SERPL-CCNC: 18 U/L (ref 0–39)
BILIRUB SERPL-MCNC: 0.6 MG/DL (ref 0–1.2)
BUN BLDV-MCNC: 16 MG/DL (ref 6–23)
C-REACTIVE PROTEIN: 1.1 MG/DL (ref 0–0.4)
CALCIUM SERPL-MCNC: 10 MG/DL (ref 8.6–10.2)
CHLORIDE BLD-SCNC: 105 MMOL/L (ref 98–107)
CO2: 23 MMOL/L (ref 22–29)
CREAT SERPL-MCNC: 1.1 MG/DL (ref 0.7–1.2)
FOLATE: 15.2 NG/ML (ref 4.8–24.2)
GFR AFRICAN AMERICAN: >60
GFR NON-AFRICAN AMERICAN: >60 ML/MIN/1.73
GLUCOSE BLD-MCNC: 111 MG/DL (ref 74–99)
POTASSIUM SERPL-SCNC: 4.4 MMOL/L (ref 3.5–5)
RHEUMATOID FACTOR: <10 IU/ML (ref 0–13)
SODIUM BLD-SCNC: 146 MMOL/L (ref 132–146)
TOTAL PROTEIN: 7.3 G/DL (ref 6.4–8.3)
VITAMIN B-12: 544 PG/ML (ref 211–946)

## 2021-05-13 PROCEDURE — 99213 OFFICE O/P EST LOW 20 MIN: CPT | Performed by: INTERNAL MEDICINE

## 2021-05-13 RX ORDER — PREDNISONE 10 MG/1
10 TABLET ORAL DAILY
COMMUNITY
End: 2021-05-13 | Stop reason: SDUPTHER

## 2021-05-13 RX ORDER — PREDNISONE 10 MG/1
10 TABLET ORAL DAILY
Qty: 90 TABLET | Refills: 1 | Status: SHIPPED
Start: 2021-05-13 | End: 2021-11-23

## 2021-05-13 RX ORDER — CLOBETASOL PROPIONATE 0.5 MG/G
CREAM TOPICAL
COMMUNITY
Start: 2021-04-22

## 2021-05-13 NOTE — PROGRESS NOTES
Subjective:     Chief Complaint   Patient presents with    Peripheral Neuropathy    Pain   Complains of pain in both legs  Burning sensation in the feet he is taking Advil with good relief he would like to see a pain doctor he brought the name of Dr. Blake Shetty    He follow-up on the hyperlipidemia he does have psoriasis using Stelara  History of pernicious anemia has not had a blood work recently    History of hyperlipidemia doing well    History of prostate cancer stable    Past Medical History:   Diagnosis Date    Cancer [facility]) 2003    prostate-post seed implants 2003    Diverticulosis     GERD (gastroesophageal reflux disease)     Hemorrhoids     Hiatal hernia     Hyperlipidemia     Osteoarthritis     Psoriasis         Social History     Socioeconomic History    Marital status:      Spouse name: Not on file    Number of children: Not on file    Years of education: Not on file    Highest education level: Not on file   Occupational History    Not on file   Social Needs    Financial resource strain: Not on file    Food insecurity     Worry: Never true     Inability: Never true    Transportation needs     Medical: No     Non-medical: No   Tobacco Use    Smoking status: Former Smoker     Packs/day: 0.25     Years: 5.00     Pack years: 1.25     Types: Cigarettes     Quit date: 2000     Years since quittin.3    Smokeless tobacco: Never Used   Substance and Sexual Activity    Alcohol use:  Yes     Alcohol/week: 21.0 standard drinks     Types: 21 Cans of beer per week    Drug use: No    Sexual activity: Not on file   Lifestyle    Physical activity     Days per week: Not on file     Minutes per session: Not on file    Stress: Not on file   Relationships    Social connections     Talks on phone: Not on file     Gets together: Not on file     Attends Episcopalian service: Not on file     Active member of club or organization: Not on file     Attends meetings of clubs or organizations: Not on file     Relationship status: Not on file    Intimate partner violence     Fear of current or ex partner: Not on file     Emotionally abused: Not on file     Physically abused: Not on file     Forced sexual activity: Not on file   Other Topics Concern    Not on file   Social History Narrative    Not on file        Past Surgical History:   Procedure Laterality Date    CARDIAC CATHETERIZATION  03/2007    COLONOSCOPY  08/2011        Family History   Problem Relation Age of Onset    No Known Problems Mother     Heart Attack Father     Heart Disease Father     No Known Problems Sister     No Known Problems Brother     No Known Problems Maternal Aunt     Heart Attack Maternal Uncle     No Known Problems Paternal Aunt     No Known Problems Paternal Uncle     No Known Problems Maternal Grandmother     No Known Problems Maternal Grandfather     No Known Problems Paternal Grandmother     No Known Problems Paternal Grandfather     No Known Problems Maternal Cousin     No Known Problems Paternal Cousin     Diabetes Grandchild     No Known Problems Sister     Cancer Sister         breast    Heart Disease Sister     Heart Attack Sister     Other Brother         collapsed lung    Cancer Brother         patient doesn't know type    Alzheimer's Disease Brother         No Known Allergies     ROS  No acute distress  Cardiac: Denies any chest pain or palpitation  Cardiac cath March 2007 -  Respiratory: Denies any cough or shortness of breath  GI: No abdominal pain.  Denies any nausea vomiting or diarrhea  CT abdomen pelvis July 2018 -  Colonoscopy August 2011 no polyp done by Dr. Vincenzo Wolfe  Endoscopy September 2016  : Denies any dysuria frequency or hematuria  Neuro: No headache or dizziness  Endocrine: No diabetes  Skin: normal  No recent weight gain or weight loss  Denies any change in vision    Objective:    /80   Pulse 115   Temp 98 °F (36.7 °C)   Wt 163 lb (73.9 kg)   SpO2 98%   BMI 29.81 Date/Time     02/02/2021 12:36 PM     11/06/2019 01:46 PM     10/03/2018 12:48 PM    K 4.5 02/02/2021 12:36 PM    K 4.3 06/09/2020    K 4.0 11/06/2019 01:46 PM    K 4.9 07/03/2019    K 5.1 (H) 10/03/2018 12:48 PM     02/02/2021 12:36 PM     11/06/2019 01:46 PM     10/03/2018 12:48 PM    CO2 24 02/02/2021 12:36 PM    CO2 26 11/06/2019 01:46 PM    CO2 27 10/03/2018 12:48 PM    BUN 10 02/02/2021 12:36 PM    BUN 12 11/06/2019 01:46 PM    BUN 12 10/03/2018 12:48 PM    CREATININE 0.8 02/02/2021 12:36 PM    CREATININE 0.9 06/09/2020    CREATININE 1.1 11/06/2019 01:46 PM    CREATININE 1.0 07/03/2019    CREATININE 0.9 10/03/2018 12:48 PM    GLUCOSE 127 (H) 02/02/2021 12:36 PM    GLUCOSE 92 11/06/2019 01:46 PM    GLUCOSE 95 10/03/2018 12:48 PM    GLUCOSE 99 03/23/2012 08:45 AM    CALCIUM 9.6 02/02/2021 12:36 PM    CALCIUM 9.4 11/06/2019 01:46 PM    CALCIUM 9.6 10/03/2018 12:48 PM    PROT 7.1 02/02/2021 12:36 PM    PROT 6.9 11/06/2019 01:46 PM    PROT 7.1 10/03/2018 12:48 PM    LABALBU 4.3 02/02/2021 12:36 PM    LABALBU 4.0 11/06/2019 01:46 PM    LABALBU 4.2 10/03/2018 12:48 PM    LABALBU 4.2 03/23/2012 08:45 AM    BILITOT 0.5 02/02/2021 12:36 PM    BILITOT 0.5 11/06/2019 01:46 PM    BILITOT 0.4 10/03/2018 12:48 PM    ALKPHOS 81 02/02/2021 12:36 PM    ALKPHOS 90 11/06/2019 01:46 PM    ALKPHOS 77 10/03/2018 12:48 PM    AST 17 02/02/2021 12:36 PM    AST 20 11/06/2019 01:46 PM    AST 25 10/03/2018 12:48 PM    ALT 9 02/02/2021 12:36 PM    ALT 10 11/06/2019 01:46 PM    ALT 14 10/03/2018 12:48 PM        CBC:   Lab Results   Component Value Date/Time    WBC 8.0 02/02/2021 12:36 PM    RBC 4.77 02/02/2021 12:36 PM    HGB 13.1 02/02/2021 12:36 PM    HCT 41.1 02/02/2021 12:36 PM    MCV 86.2 02/02/2021 12:36 PM    MCH 27.5 02/02/2021 12:36 PM    MCHC 31.9 (L) 02/02/2021 12:36 PM    RDW 14.0 02/02/2021 12:36 PM     02/02/2021 12:36 PM    MPV 10.3 02/02/2021 12:36 PM       A1C:  Lab Results Component Value Date/Time    LABA1C 5.5 06/09/2020       Lipid panel:  Lab Results   Component Value Date    CHOL 175 02/02/2021    CHOL 162 06/09/2020    CHOL 164 07/03/2019    TRIG 65 02/02/2021    TRIG 54 06/09/2020    TRIG 66 07/03/2019    HDL 88 02/02/2021    HDL 85 06/09/2020    HDL 66 07/03/2019        Lab Results   Component Value Date/Time    PROT 7.1 02/02/2021 12:36 PM    PROT 6.9 11/06/2019 01:46 PM    PROT 7.1 10/03/2018 12:48 PM       No results found for: MG      Assessment. Gonzales Sawyer was seen today for peripheral neuropathy and pain. Diagnoses and all orders for this visit:    Psoriatic arthritis ([facility]  -     External Referral To Pain [address]; Future  -     RHEUMATOID FACTOR; Future  -     C-REACTIVE PROTEIN; Future  -     COMPREHENSIVE METABOLIC PANEL; Future    Neuropathy  -     VITAMIN B12 & FOLATE; Future    Hyperlipidemia, unspecified hyperlipidemia type    Prostate cancer ([facility]    Psoriasis    Pernicious anemia    Other orders  -     predniSONE (DELTASONE) 10 MG tablet; Take 1 tablet by mouth daily       Patient Active Problem List   Diagnosis    Prostate cancer ([facility]    Psoriasis    Arthritis    Neuropathy    Hyperlipidemia       Plan: Prednisone taper dose declined to see rheumatologist    Neuropathy check B12 level he likes to see Dr. Blake Shetty referral done    Prostate cancer status post seed implant seen by Dr. Chirag Tafoya in the past  Last PSA less than 0.03      Psoriasis ulcerative proctitis continue Stelara    Check sed rate, CRP, B12 level  Return in about 3 months (around 8/13/2021).        Geovanny Abebe MD  2:33 PM  5/13/2021     [place]

## 2021-05-14 LAB — SEDIMENTATION RATE, ERYTHROCYTE: 0 MM/HR (ref 0–15)

## 2021-05-26 ENCOUNTER — TELEPHONE (OUTPATIENT)
Dept: FAMILY MEDICINE CLINIC | Age: 86
End: 2021-05-26

## 2021-05-26 DIAGNOSIS — L40.50 PSORIATIC ARTHRITIS (HCC): Primary | ICD-10-CM

## 2021-05-26 NOTE — TELEPHONE ENCOUNTER
Daughter called.  Patient stated he would like to see a rheumatologist. Pain doctor could not help      Daughter  [phone]

## 2021-06-18 ENCOUNTER — TELEPHONE (OUTPATIENT)
Dept: INTERNAL MEDICINE | Age: 86
End: 2021-06-18

## 2021-07-19 ENCOUNTER — TELEPHONE (OUTPATIENT)
Dept: FAMILY MEDICINE CLINIC | Age: 86
End: 2021-07-19

## 2021-07-19 RX ORDER — METHYLPREDNISOLONE 4 MG/1
TABLET ORAL
Qty: 1 KIT | Refills: 0 | Status: SHIPPED | OUTPATIENT
Start: 2021-07-19 | End: 2021-07-25

## 2021-07-19 RX ORDER — TIZANIDINE 4 MG/1
4 TABLET ORAL NIGHTLY PRN
Qty: 30 TABLET | Refills: 0 | Status: SHIPPED
Start: 2021-07-19 | End: 2021-08-19

## 2021-08-10 ENCOUNTER — TELEPHONE (OUTPATIENT)
Dept: FAMILY MEDICINE CLINIC | Age: 86
End: 2021-08-10

## 2021-08-10 DIAGNOSIS — G89.29 CHRONIC BILATERAL LOW BACK PAIN WITH SCIATICA, SCIATICA LATERALITY UNSPECIFIED: Primary | ICD-10-CM

## 2021-08-10 DIAGNOSIS — M54.10 RADICULOPATHY, UNSPECIFIED SPINAL REGION: ICD-10-CM

## 2021-08-10 DIAGNOSIS — M54.40 CHRONIC BILATERAL LOW BACK PAIN WITH SCIATICA, SCIATICA LATERALITY UNSPECIFIED: Primary | ICD-10-CM

## 2021-08-18 ENCOUNTER — OFFICE VISIT (OUTPATIENT)
Dept: FAMILY MEDICINE CLINIC | Age: 86
End: 2021-08-18
Payer: MEDICARE

## 2021-08-18 VITALS
RESPIRATION RATE: 18 BRPM | BODY MASS INDEX: 30.09 KG/M2 | TEMPERATURE: 98 F | HEIGHT: 62 IN | WEIGHT: 163.5 LBS | OXYGEN SATURATION: 98 % | SYSTOLIC BLOOD PRESSURE: 130 MMHG | DIASTOLIC BLOOD PRESSURE: 88 MMHG | HEART RATE: 90 BPM

## 2021-08-18 DIAGNOSIS — L40.9 PSORIASIS: ICD-10-CM

## 2021-08-18 DIAGNOSIS — F41.1 GENERALIZED ANXIETY DISORDER: ICD-10-CM

## 2021-08-18 DIAGNOSIS — R29.898 WEAKNESS OF RIGHT LEG: ICD-10-CM

## 2021-08-18 DIAGNOSIS — M54.16 LUMBAR RADICULOPATHY: Primary | ICD-10-CM

## 2021-08-18 DIAGNOSIS — C61 PROSTATE CANCER (HCC): ICD-10-CM

## 2021-08-18 DIAGNOSIS — L40.50 PSORIATIC ARTHRITIS (HCC): ICD-10-CM

## 2021-08-18 PROCEDURE — 99213 OFFICE O/P EST LOW 20 MIN: CPT | Performed by: INTERNAL MEDICINE

## 2021-08-18 RX ORDER — LORAZEPAM 0.5 MG/1
0.5 TABLET ORAL EVERY 8 HOURS PRN
Qty: 30 TABLET | Refills: 0 | Status: SHIPPED | OUTPATIENT
Start: 2021-08-18 | End: 2021-09-17

## 2021-08-18 NOTE — PROGRESS NOTES
Subjective:     Chief Complaint   Patient presents with    3 Month Follow-Up   Patient complains of pain in the back and the right leg for the past 2 months he was seen by a pain doctor before  They did x-ray there was a question of arthritis,  He has been treated with Medrol Dosepak and muscle relaxers no help  He called here appointment was made to go to pain clinic he has appointment tomorrow    He has psoriasis which is being treated with Stelara and that is helping    History of prostate cancer in the past    His right leg is weak and sometimes gives out    Feels depressed or anxious sometimes he is taking care of his wife who is bedridden he has to do everything in the house    Past Medical History:   Diagnosis Date    Cancer [facility]) 2003    prostate-post seed implants 2003    Diverticulosis     GERD (gastroesophageal reflux disease)     Hemorrhoids     Hiatal hernia     Hyperlipidemia     Osteoarthritis     Psoriasis         Social History     Socioeconomic History    Marital status:      Spouse name: Not on file    Number of children: Not on file    Years of education: Not on file    Highest education level: Not on file   Occupational History    Not on file   Tobacco Use    Smoking status: Former Smoker     Packs/day: 0.25     Years: 5.00     Pack years: 1.25     Types: Cigarettes     Quit date: 2000     Years since quittin.6    Smokeless tobacco: Never Used   Vaping Use    Vaping Use: Never used   Substance and Sexual Activity    Alcohol use:  Yes     Alcohol/week: 21.0 standard drinks     Types: 21 Cans of beer per week    Drug use: No    Sexual activity: Not on file   Other Topics Concern    Not on file   Social History Narrative    Not on file     Social Determinants of Health     Financial Resource Strain:     Difficulty of Paying Living Expenses:    Food Insecurity: No Food Insecurity    Worried About Running Out of Food in the Last Year: Never true    Isiah of Food in the Last Year: Never true   Transportation Needs: No Transportation Needs    Lack of Transportation (Medical): No    Lack of Transportation (Non-Medical):  No   Physical Activity:     Days of Exercise per Week:     Minutes of Exercise per Session:    Stress:     Feeling of Stress :    Social Connections:     Frequency of Communication with Friends and Family:     Frequency of Social Gatherings with Friends and Family:     Attends Zoroastrianism Services:     Active Member of Clubs or Organizations:     Attends Club or Organization Meetings:     Marital Status:    Intimate Partner Violence:     Fear of Current or Ex-Partner:     Emotionally Abused:     Physically Abused:     Sexually Abused:         Past Surgical History:   Procedure Laterality Date    CARDIAC CATHETERIZATION  03/2007    COLONOSCOPY  08/2011        Family History   Problem Relation Age of Onset    No Known Problems Mother     Heart Attack Father     Heart Disease Father     No Known Problems Sister     No Known Problems Brother     No Known Problems Maternal Aunt     Heart Attack Maternal Uncle     No Known Problems Paternal Aunt     No Known Problems Paternal Uncle     No Known Problems Maternal Grandmother     No Known Problems Maternal Grandfather     No Known Problems Paternal Grandmother     No Known Problems Paternal Grandfather     No Known Problems Maternal Cousin     No Known Problems Paternal Cousin     Diabetes Grandchild     No Known Problems Sister     Cancer Sister         breast    Heart Disease Sister     Heart Attack Sister     Other Brother         collapsed lung    Cancer Brother         patient doesn't know type    Alzheimer's Disease Brother         No Known Allergies     ROS  No acute distress  Cardiac: Denies any chest pain or palpitation  Seen by Dr. Marian Burnett in June 2021  Advised follow-up as needed  Cardiac cath March 2007 -  Respiratory: Denies any cough or shortness of breath  GI: No abdominal pain. Denies any nausea vomiting or diarrhea  CT abdomen pelvis July 2018 -  Colonoscopy August 2011 no polyp rock Dr. Uche Pendleton  Endoscopy September 2016  : Denies any dysuria frequency or hematuria  History of prostate cancer  Neuro: No headache or dizziness  Endocrine: No diabetes  Skin: normal  No recent weight gain or weight loss  Denies any change in vision    Objective:    /88   Pulse 90   Temp 98 °F (36.7 °C)   Resp 18   Ht 5' 2\" (1.575 m)   Wt 163 lb 8 oz (74.2 kg)   SpO2 98%   BMI 29.90 kg/m²     Constitutional: Alert awake and oriented  Eyes: Pupils equal bilaterally. Extraocular muscles intact  Neck: no JVD adenopathy no bruit  Heart:  RRR, no murmurs, gallops, or rubs. Lungs:    no wheeze, rales or rhonchi  Abd: bowel sounds present, nontender, nondistended, no masses  Extrem:  No clubbing, cyanosis, or edema  Neuro: AAOx3,No Focal deficit  Psychological: no depression or anxiety   Right leg strength 4 out of 5  Straight leg raise was positive      Current Outpatient Medications   Medication Sig Dispense Refill    LORazepam (ATIVAN) 0.5 MG tablet Take 1 tablet by mouth every 8 hours as needed for Anxiety for up to 30 days. 30 tablet 0    tiZANidine (ZANAFLEX) 4 MG tablet Take 1 tablet by mouth nightly as needed (Back pain) 30 tablet 0    clobetasol (TEMOVATE) 0.05 % cream       predniSONE (DELTASONE) 10 MG tablet Take 1 tablet by mouth daily 90 tablet 1    vitamin D (ERGOCALCIFEROL) 1.25 MG (03741 UT) CAPS capsule Take 50,000 Units by mouth once a week      Omega-3 Fatty Acids (OMEGA-3 FISH OIL PO) Take 1 capsule by mouth daily      Ustekinumab (STELARA SC) Inject 1 Syringe into the skin every 3 months       No current facility-administered medications for this visit.         Last 3 BMP  Lab Results   Component Value Date/Time     05/13/2021 02:45 PM     02/02/2021 12:36 PM     11/06/2019 01:46 PM    K 4.4 05/13/2021 02:45 PM    K 4.5 02/02/2021 12:36 PM    K 4.3 06/09/2020 12:00 AM    K 4.0 11/06/2019 01:46 PM     05/13/2021 02:45 PM     02/02/2021 12:36 PM     11/06/2019 01:46 PM    CO2 23 05/13/2021 02:45 PM    CO2 24 02/02/2021 12:36 PM    CO2 26 11/06/2019 01:46 PM    BUN 16 05/13/2021 02:45 PM    BUN 10 02/02/2021 12:36 PM    BUN 12 11/06/2019 01:46 PM    CREATININE 1.1 05/13/2021 02:45 PM    CREATININE 0.8 02/02/2021 12:36 PM    CREATININE 0.9 06/09/2020 12:00 AM    CREATININE 1.1 11/06/2019 01:46 PM    CREATININE 1.0 07/03/2019 12:00 AM    GLUCOSE 111 (H) 05/13/2021 02:45 PM    GLUCOSE 127 (H) 02/02/2021 12:36 PM    GLUCOSE 92 11/06/2019 01:46 PM    GLUCOSE 99 03/23/2012 08:45 AM    CALCIUM 10.0 05/13/2021 02:45 PM    CALCIUM 9.6 02/02/2021 12:36 PM    CALCIUM 9.4 11/06/2019 01:46 PM       Last 3 CMP:    Lab Results   Component Value Date/Time     05/13/2021 02:45 PM     02/02/2021 12:36 PM     11/06/2019 01:46 PM    K 4.4 05/13/2021 02:45 PM    K 4.5 02/02/2021 12:36 PM    K 4.3 06/09/2020 12:00 AM    K 4.0 11/06/2019 01:46 PM     05/13/2021 02:45 PM     02/02/2021 12:36 PM     11/06/2019 01:46 PM    CO2 23 05/13/2021 02:45 PM    CO2 24 02/02/2021 12:36 PM    CO2 26 11/06/2019 01:46 PM    BUN 16 05/13/2021 02:45 PM    BUN 10 02/02/2021 12:36 PM    BUN 12 11/06/2019 01:46 PM    CREATININE 1.1 05/13/2021 02:45 PM    CREATININE 0.8 02/02/2021 12:36 PM    CREATININE 0.9 06/09/2020 12:00 AM    CREATININE 1.1 11/06/2019 01:46 PM    CREATININE 1.0 07/03/2019 12:00 AM    GLUCOSE 111 (H) 05/13/2021 02:45 PM    GLUCOSE 127 (H) 02/02/2021 12:36 PM    GLUCOSE 92 11/06/2019 01:46 PM    GLUCOSE 99 03/23/2012 08:45 AM    CALCIUM 10.0 05/13/2021 02:45 PM    CALCIUM 9.6 02/02/2021 12:36 PM    CALCIUM 9.4 11/06/2019 01:46 PM    PROT 7.3 05/13/2021 02:45 PM    PROT 7.1 02/02/2021 12:36 PM    PROT 6.9 11/06/2019 01:46 PM    LABALBU 4.4 05/13/2021 02:45 PM    LABALBU 4.3 02/02/2021 12:36 PM    LABALBU 4.0 11/06/2019 01:46 PM    LABALBU 4.2 03/23/2012 08:45 AM    BILITOT 0.6 05/13/2021 02:45 PM    BILITOT 0.5 02/02/2021 12:36 PM    BILITOT 0.5 11/06/2019 01:46 PM    ALKPHOS 78 05/13/2021 02:45 PM    ALKPHOS 81 02/02/2021 12:36 PM    ALKPHOS 90 11/06/2019 01:46 PM    AST 18 05/13/2021 02:45 PM    AST 17 02/02/2021 12:36 PM    AST 20 11/06/2019 01:46 PM    ALT 9 05/13/2021 02:45 PM    ALT 9 02/02/2021 12:36 PM    ALT 10 11/06/2019 01:46 PM        CBC:   Lab Results   Component Value Date/Time    WBC 8.0 02/02/2021 12:36 PM    RBC 4.77 02/02/2021 12:36 PM    HGB 13.1 02/02/2021 12:36 PM    HCT 41.1 02/02/2021 12:36 PM    MCV 86.2 02/02/2021 12:36 PM    MCH 27.5 02/02/2021 12:36 PM    MCHC 31.9 (L) 02/02/2021 12:36 PM    RDW 14.0 02/02/2021 12:36 PM     02/02/2021 12:36 PM    MPV 10.3 02/02/2021 12:36 PM       A1C:  Lab Results   Component Value Date/Time    LABA1C 5.5 06/09/2020 12:00 AM       Lipid panel:  Lab Results   Component Value Date    CHOL 175 02/02/2021    CHOL 162 06/09/2020    CHOL 164 07/03/2019    TRIG 65 02/02/2021    TRIG 54 06/09/2020    TRIG 66 07/03/2019    HDL 88 02/02/2021    HDL 85 06/09/2020    HDL 66 07/03/2019        Lab Results   Component Value Date/Time    PROT 7.3 05/13/2021 02:45 PM    PROT 7.1 02/02/2021 12:36 PM    PROT 6.9 11/06/2019 01:46 PM       No results found for: MG      Assessment. Annetta Rob was seen today for 3 month follow-up. Diagnoses and all orders for this visit:    Lumbar radiculopathy    Generalized anxiety disorder  -     LORazepam (ATIVAN) 0.5 MG tablet; Take 1 tablet by mouth every 8 hours as needed for Anxiety for up to 30 days.     Weakness of right leg    Psoriasis    Prostate cancer ([facility]    Psoriatic arthritis ([facility]       Patient Active Problem List   Diagnosis    Prostate cancer ([facility]    Psoriasis    Arthritis    Neuropathy    Hyperlipidemia    Lumbar radiculopathy    Weakness of right leg    Generalized anxiety disorder    [facility] ([facility] Plan: Lumbar radiculopathy with weakness in the right leg he needs MRI lumbosacral spine he is going to pain clinic tomorrow    Generalized anxiety the daughter Marycarmen Gee and the patient wanted to try some Ativan risk discussed    Prostate cancer has been stable    Possible psoriatic arthritis and psoriasis patient on Stelara  He is declining to see any rheumatologist    Discussed with the daughter Ebony Mesa  [phone]    No follow-ups on file.        Asha Naranjo MD  2:55 PM  8/18/2021     [place]

## 2021-08-19 ENCOUNTER — OFFICE VISIT (OUTPATIENT)
Dept: PAIN MANAGEMENT | Age: 86
End: 2021-08-19
Payer: MEDICARE

## 2021-08-19 ENCOUNTER — TELEPHONE (OUTPATIENT)
Dept: FAMILY MEDICINE CLINIC | Age: 86
End: 2021-08-19

## 2021-08-19 VITALS
BODY MASS INDEX: 27.16 KG/M2 | HEIGHT: 65 IN | WEIGHT: 163 LBS | OXYGEN SATURATION: 94 % | HEART RATE: 93 BPM | RESPIRATION RATE: 16 BRPM | TEMPERATURE: 97.3 F | DIASTOLIC BLOOD PRESSURE: 88 MMHG | SYSTOLIC BLOOD PRESSURE: 120 MMHG

## 2021-08-19 DIAGNOSIS — M47.816 LUMBAR SPONDYLOSIS: ICD-10-CM

## 2021-08-19 DIAGNOSIS — M51.9 LUMBAR DISC DISORDER: Primary | ICD-10-CM

## 2021-08-19 DIAGNOSIS — M54.16 LUMBAR RADICULOPATHY: ICD-10-CM

## 2021-08-19 DIAGNOSIS — M48.061 SPINAL STENOSIS OF LUMBAR REGION WITHOUT NEUROGENIC CLAUDICATION: ICD-10-CM

## 2021-08-19 DIAGNOSIS — G89.4 CHRONIC PAIN SYNDROME: ICD-10-CM

## 2021-08-19 PROCEDURE — 99204 OFFICE O/P NEW MOD 45 MIN: CPT | Performed by: PAIN MEDICINE

## 2021-08-19 RX ORDER — IBUPROFEN 200 MG
200 TABLET ORAL EVERY 6 HOURS PRN
COMMUNITY
End: 2021-11-23

## 2021-08-19 NOTE — PROGRESS NOTES
Do you currently have any of the following:    Fever: No  Headache:  No  Cough: No  Shortness of breath: No  Exposed to anyone with these symptoms: No                                                                                                                Keara Brock presents to the [address] [id number] on 8/19/2021. Memo Juarez is complaining of pain lower back and right leg. . The pain is intermittent. The pain is described as aching, throbbing, shooting, stabbing and sharp. Pain is rated on his best day at a 8, on his worst day at a 10, and on average at a 9 on the VAS scale. He took his last dose of Motrin . Memo Juarez does not have issues with constipation. Any procedures since your last visit: No,    He is  on NSAIDS and  is not on anticoagulation medications to include none and is managed by NA. Pacemaker or defibrillator: No Physician managing device is NA. Medication Contract and Consent for Opioid Use Documents Filed      No documents found                   /88   Pulse 93   Temp 97.3 °F (36.3 °C) (Infrared)   Resp 16   Ht 5' 4.5\" (1.638 m)   Wt 163 lb (73.9 kg)   SpO2 94%   BMI 27.55 kg/m²      No LMP for male patient.

## 2021-08-19 NOTE — PROGRESS NOTES
[facility]        [address] [facility], [address] [facility], [place]      [phone]          Consult Note      Patient:  AURORA Manzanares 1935    Date of Service:  21    Requesting Physician:  No ref. provider found    Reason for Consult:      Patient presents with complaints of right lower extremity pain that started in 2021 and has been stable. HISTORY OF PRESENT ILLNESS:      Pain does radiate to right leg. He  has numbness of the bilateral feet and does not have bladder or bowel dysfunction. He has not been on anticoagulation medications to include none. The patient  has not been on herbal supplements. The patient is not diabetic. Imaging:   Lumbar spine Xray severe degenerative changes L1-2 with L5-S1 foraminal stenosis. Previous treatments: medications. .      Opioid Agreement:  Date enacted:N/A  Renewal date:N/A    Past Medical History:   Diagnosis Date    Cancer ([facility] [address]     prostate-post seed implants     Diverticulosis     GERD (gastroesophageal reflux disease)     Hemorrhoids     Hiatal hernia     Hyperlipidemia     Osteoarthritis     Psoriasis      Past Surgical History:   Procedure Laterality Date    CARDIAC CATHETERIZATION  2007    COLONOSCOPY  2011     Prior to Admission medications    Medication Sig Start Date End Date Taking? Authorizing Provider   ibuprofen (ADVIL;MOTRIN) 200 MG tablet Take 200 mg by mouth every 6 hours as needed for Pain   Yes Historical Provider, MD   clobetasol (TEMOVATE) 0.05 % cream  21  Yes Historical Provider, MD   predniSONE (DELTASONE) 10 MG tablet Take 1 tablet by mouth daily 21  Yes Anitra Daigle MD   Ustekinumab (STELARA SC) Inject 1 Syringe into the skin every 3 months   Yes Historical Provider, MD   LORazepam (ATIVAN) 0.5 MG tablet Take 1 tablet by mouth every 8 hours as needed for Anxiety for up to 30 days.   Patient not taking: Reported on 2021 21  Tiburcio Osuna MD   vitamin D (ERGOCALCIFEROL) 1.25 MG (89007 UT) CAPS capsule Take 50,000 Units by mouth once a week  Patient not taking: Reported on 2021    Historical Provider, MD   Omega-3 Fatty Acids (OMEGA-3 FISH OIL PO) Take 1 capsule by mouth daily  Patient not taking: Reported on 2021    Historical Provider, MD     No Known Allergies    Social History     Socioeconomic History    Marital status:      Spouse name: Not on file    Number of children: Not on file    Years of education: Not on file    Highest education level: Not on file   Occupational History    Not on file   Tobacco Use    Smoking status: Former Smoker     Packs/day: 0.25     Years: 5.00     Pack years: 1.25     Types: Cigarettes     Quit date: 2000     Years since quittin.6    Smokeless tobacco: Never Used   Vaping Use    Vaping Use: Never used   Substance and Sexual Activity    Alcohol use: Yes     Alcohol/week: 21.0 standard drinks     Types: 21 Cans of beer per week    Drug use: No    Sexual activity: Not on file   Other Topics Concern    Not on file   Social History Narrative    Not on file     Social Determinants of Health     Financial Resource Strain:     Difficulty of Paying Living Expenses:    Food Insecurity: No Food Insecurity    Worried About Running Out of Food in the Last Year: Never true    Isiah of Food in the Last Year: Never true   Transportation Needs: No Transportation Needs    Lack of Transportation (Medical): No    Lack of Transportation (Non-Medical):  No   Physical Activity:     Days of Exercise per Week:     Minutes of Exercise per Session:    Stress:     Feeling of Stress :    Social Connections:     Frequency of Communication with Friends and Family:     Frequency of Social Gatherings with Friends and Family:     Attends Sikh Services:     Active Member of Clubs or Organizations:     Attends Club or Organization Meetings:     Marital Status: Intimate Partner Violence:     Fear of Current or Ex-Partner:     Emotionally Abused:     Physically Abused:     Sexually Abused:        Family History   Problem Relation Age of Onset    No Known Problems Mother     Heart Attack Father     Heart Disease Father     No Known Problems Sister     No Known Problems Brother     No Known Problems Maternal Aunt     Heart Attack Maternal Uncle     No Known Problems Paternal Aunt     No Known Problems Paternal Uncle     No Known Problems Maternal Grandmother     No Known Problems Maternal Grandfather     No Known Problems Paternal Grandmother     No Known Problems Paternal Grandfather     No Known Problems Maternal Cousin     No Known Problems Paternal Cousin     Diabetes Grandchild     No Known Problems Sister     Cancer Sister         breast    Heart Disease Sister     Heart Attack Sister     Other Brother         collapsed lung    Cancer Brother         patient doesn't know type    Alzheimer's Disease Brother        REVIEW OF SYSTEMS:     Patient specifically denies fever/chills, chest pain, shortness of breath, new bowel or bladder complaints. All other review of systems was negative. PHYSICAL EXAMINATION:      /88   Pulse 93   Temp 97.3 °F (36.3 °C) (Infrared)   Resp 16   Ht 5' 4.5\" (1.638 m)   Wt 163 lb (73.9 kg)   SpO2 94%   BMI 27.55 kg/m²     General:      General appearance:   elderly, pleasant and well-hydrated. , in moderate discomfort and A & O x3  Build:Normal Weight    HEENT:    Head:normocephalic and atraumatic  Sclera: icterus absent,     Lungs:    Breathing:Breathing Pattern: regular, no distress    Abdomen:    Shape:non-distended and normal  Tenderness:none    Lumbar spine:    Spine inspection:normal   CVA tenderness:No   Palpation:facet loading, left, right and negative. Range of motion:abnormal moderately Lateral bending, flexion, extension rotation bilateral and is  painful.     Musculoskeletal:    Trigger points in Paraveteral:absent bilaterally  SI joint tenderness:negative right, negative left              BRANDAN test:not done right, not done             left  Piriformis tenderness:negative right, negative left  Trochanteric bursa tenderness:negative right, negative left  SLR:negative right, negative left, sitting     Extremities:    Tremors:None bilaterally upper and lower  Range of motion:pain with internal rotation of hips negative. Intact:Yes  Edema:Normal    Neurological:    Sensory:normal to light touch bilateral lower extremities. Motor:                           Right Quadriceps5/5          Left Quadriceps5/5           Right Gastrocnemius5/5    Left Gastrocnemius5/5  Right Ant Tibialis5/5  Left Ant Tibialis5/5  Reflexes:    Right Quadriceps reflex2+  Left Quadriceps reflex2+  Right Achilles reflex2+  Left Achilles reflex2+  Gait:antalgic with a cane    Dermatology:    Skin:no unusual rashes and no skin lesions    Impression:  Right lower extremity pain with h/o psoriasis   Plan:  Lumbar radiculopathy. Reviewed lumbar spine Xray(actual films) and discussed with the patient. Will schedule patient for lumbar spine MRI. Discussed treatment options including lumbar epidural, currently his pain is tolerable. Cancel urine screen. OARRS report reviewed 08/2021. Patient encouraged to stay active. Treatment plan discussed with the patient including medications and procedure side effects. We discussed with the patient that combining opioids, benzodiazepines, alcohol, illicit drugs or sleep aids increases the risk of respiratory depression including death. We discussed that these medications may cause drowsiness, sedation or dizziness and have counseled the patient not to drive or operate machinery. We have discussed that these medications will be prescribed only by one provider.  We have discussed with the patient about age related risk factors and have thoroughly discussed the importance of taking these medications as prescribed. The patient verbalizes understanding. ccrefmarquitaing patrick Terrell M.D.

## 2021-08-19 NOTE — TELEPHONE ENCOUNTER
Patient's daughter called to inform pharmacy did not receive RX for Ativan. Noted RX was ordered, but not sent electronically. Please send RX to [facility].

## 2021-08-26 ENCOUNTER — HOSPITAL ENCOUNTER (OUTPATIENT)
Dept: MRI IMAGING | Age: 86
Discharge: HOME | End: 2021-08-28
Payer: MEDICARE

## 2021-08-26 DIAGNOSIS — M54.16 LUMBAR RADICULOPATHY: ICD-10-CM

## 2021-08-26 PROCEDURE — 72148 MRI LUMBAR SPINE W/O DYE: CPT

## 2021-08-31 ENCOUNTER — OFFICE VISIT (OUTPATIENT)
Dept: PAIN MANAGEMENT | Age: 86
End: 2021-08-31
Payer: MEDICARE

## 2021-08-31 VITALS
HEIGHT: 65 IN | HEART RATE: 74 BPM | OXYGEN SATURATION: 97 % | DIASTOLIC BLOOD PRESSURE: 80 MMHG | WEIGHT: 160 LBS | SYSTOLIC BLOOD PRESSURE: 122 MMHG | BODY MASS INDEX: 26.66 KG/M2 | RESPIRATION RATE: 16 BRPM | TEMPERATURE: 96.4 F

## 2021-08-31 DIAGNOSIS — M47.816 LUMBAR SPONDYLOSIS: ICD-10-CM

## 2021-08-31 DIAGNOSIS — M51.9 LUMBAR DISC DISORDER: ICD-10-CM

## 2021-08-31 DIAGNOSIS — G89.4 CHRONIC PAIN SYNDROME: ICD-10-CM

## 2021-08-31 DIAGNOSIS — M54.16 LUMBAR RADICULOPATHY: ICD-10-CM

## 2021-08-31 DIAGNOSIS — M48.061 SPINAL STENOSIS OF LUMBAR REGION WITHOUT NEUROGENIC CLAUDICATION: Primary | ICD-10-CM

## 2021-08-31 PROCEDURE — 99214 OFFICE O/P EST MOD 30 MIN: CPT | Performed by: PAIN MEDICINE

## 2021-08-31 PROCEDURE — 99213 OFFICE O/P EST LOW 20 MIN: CPT | Performed by: PAIN MEDICINE

## 2021-08-31 NOTE — PROGRESS NOTES
Do you currently have any of the following:    Fever: No  Headache:  No  Cough: No  Shortness of breath: No  Exposed to anyone with these symptoms: No                                                                                                                Letty Okeefe presents to the [facility] on 8/31/2021. Rachel Harman is complaining of pain leg and foot . The pain is persistent. The pain is described as shooting, numbness. Pain is rated on his best day at a 7, on his worst day at a 10, and on average at a 8 on the VAS scale. He took his last dose of Motrin today. Rachel Harman does not have issues with constipation. Any procedures since your last visit: No.    He is  on NSAIDS and  is not on anticoagulation medications to include none    Pacemaker or defibrillator: No   Medication Contract and Consent for Opioid Use Documents Filed      No documents found                   /80   Pulse 74   Temp 96.4 °F (35.8 °C) (Infrared)   Resp 16   Ht 5' 5\" (1.651 m)   Wt 160 lb (72.6 kg)   SpO2 97%   BMI 26.63 kg/m²      No LMP for male patient.

## 2021-08-31 NOTE — PROGRESS NOTES
[facility]  [address] [facility], [address] [place] [address]  [phone]    Follow up Note      Letty Okeefe     Date of Visit:  8/31/2021    CC:  Patient presents for follow up   Chief Complaint   Patient presents with    Follow-up     lower leg pain right     HPI:    Pain is worse. Appropriate analgesia with current medications regimen: N/A. Change in quality of symptoms:no. Medication side effects:not applicable . Recent diagnostic testing:Lumbar spine MRI. Recent interventional procedures:none. .    He has not been on anticoagulation medications to include none. The patient  has not been on herbal supplements. The patient is not diabetic.     Imaging:   Lumbar spine Xray severe degenerative changes L1-2 with L5-S1 foraminal stenosis. Lumbar spine MRI 2021  Multilevel degenerative disc disease and multilevel degenerative facet   hypertrophy with mild canal stenosis at L4-5.       Bilateral foraminal narrowing     Previous treatments: medications. .        Potential Aberrant Drug-Related Behavior:    No    Urine Drug Screening:  None    OARRS report:  08/2021 consistent    Opioid Agreement:  Date enacted:N/A  Renewal date:N/A    Past Medical History:   Diagnosis Date    Cancer [facility]) 2003    prostate-post seed implants 2003    Diverticulosis     GERD (gastroesophageal reflux disease)     Hemorrhoids     Hiatal hernia     Hyperlipidemia     Osteoarthritis     Psoriasis      Past Surgical History:   Procedure Laterality Date    CARDIAC CATHETERIZATION  03/2007    COLONOSCOPY  08/2011     Prior to Admission medications    Medication Sig Start Date End Date Taking? Authorizing Provider   ibuprofen (ADVIL;MOTRIN) 200 MG tablet Take 200 mg by mouth every 6 hours as needed for Pain   Yes Historical Provider, MD   LORazepam (ATIVAN) 0.5 MG tablet Take 1 tablet by mouth every 8 hours as needed for Anxiety for up to 30 days.  8/18/21 9/17/21 Yes Logan Allen MD   clobetasol (TEMOVATE) 0.05 % cream  21  Yes Historical Provider, MD   predniSONE (DELTASONE) 10 MG tablet Take 1 tablet by mouth daily 21  Yes Jacky Rodriguez MD   vitamin D (ERGOCALCIFEROL) 1.25 MG (48860 UT) CAPS capsule Take 50,000 Units by mouth once a week    Yes Historical Provider, MD   Omega-3 Fatty Acids (OMEGA-3 FISH OIL PO) Take 1 capsule by mouth daily Indications: Fish oil 1200 mg, Omega 3 360 mg    Yes Historical Provider, MD   Ustekinumab (STELARA SC) Inject 1 Syringe into the skin every 3 months   Yes Historical Provider, MD     No Known Allergies    Social History     Socioeconomic History    Marital status:      Spouse name: Not on file    Number of children: Not on file    Years of education: Not on file    Highest education level: Not on file   Occupational History    Not on file   Tobacco Use    Smoking status: Former Smoker     Packs/day: 0.25     Years: 5.00     Pack years: 1.25     Types: Cigarettes     Quit date: 2000     Years since quittin.6    Smokeless tobacco: Never Used   Vaping Use    Vaping Use: Never used   Substance and Sexual Activity    Alcohol use: Yes     Alcohol/week: 21.0 standard drinks     Types: 21 Cans of beer per week    Drug use: No    Sexual activity: Not on file   Other Topics Concern    Not on file   Social History Narrative    Not on file     Social Determinants of Health     Financial Resource Strain:     Difficulty of Paying Living Expenses:    Food Insecurity: No Food Insecurity    Worried About Running Out of Food in the Last Year: Never true    Isiah of Food in the Last Year: Never true   Transportation Needs: No Transportation Needs    Lack of Transportation (Medical): No    Lack of Transportation (Non-Medical):  No   Physical Activity:     Days of Exercise per Week:     Minutes of Exercise per Session:    Stress:     Feeling of Stress :    Social Connections:     Frequency of Communication with Friends and Family:     Frequency of Social Gatherings with Friends and Family:     Attends Catholic Services:     Active Member of Clubs or Organizations:     Attends Club or Organization Meetings:     Marital Status:    Intimate Partner Violence:     Fear of Current or Ex-Partner:     Emotionally Abused:     Physically Abused:     Sexually Abused:      Family History   Problem Relation Age of Onset    No Known Problems Mother     Heart Attack Father     Heart Disease Father     No Known Problems Sister     No Known Problems Brother     No Known Problems Maternal Aunt     Heart Attack Maternal Uncle     No Known Problems Paternal Aunt     No Known Problems Paternal Uncle     No Known Problems Maternal Grandmother     No Known Problems Maternal Grandfather     No Known Problems Paternal Grandmother     No Known Problems Paternal Grandfather     No Known Problems Maternal Cousin     No Known Problems Paternal Cousin     Diabetes Grandchild     No Known Problems Sister     Cancer Sister         breast    Heart Disease Sister     Heart Attack Sister     Other Brother         collapsed lung    Cancer Brother         patient doesn't know type    Alzheimer's Disease Brother      REVIEW OF SYSTEMS:     Mayda Crown denies fever/chills, chest pain, shortness of breath, new bowel or bladder complaints. All other review of systems was negative. PHYSICAL EXAMINATION:      /80   Pulse 74   Temp 96.4 °F (35.8 °C) (Infrared)   Resp 16   Ht 5' 5\" (1.651 m)   Wt 160 lb (72.6 kg)   SpO2 97%   BMI 26.63 kg/m²     General:       General appearance:   elderly, pleasant and well-hydrated.    , in mild to moderate discomfort and A & O x3  Build:Normal Weight     HEENT:     Head:normocephalic and atraumatic  Sclera: icterus absent,      Lungs:     Breathing:Breathing Pattern: regular, no distress     Abdomen:     Shape:non-distended and normal  Tenderness:none     Lumbar spine:     Spine inspection:normal   CVA tenderness:No   Palpation:facet loading, left, right and negative. Range of motion:abnormal moderately Lateral bending, flexion, extension rotation bilateral and is  painful.     Musculoskeletal:     Trigger points in Paraveteral:absent bilaterally  SI joint tenderness:negative right, negative left  SLR:positve right, negative left, sitting      Extremities:     Tremors:None bilaterally upper and lower  Range of motion:pain with internal rotation of hips negative. Intact:Yes  Edema:Normal     Neurological:     Sensory:normal to light touch bilateral lower extremities. Motor:                           Right Quadriceps5/5          Left Quadriceps5/5           Right Gastrocnemius5/5    Left Gastrocnemius5/5  Right Ant Tibialis5/5  Left Ant Tibialis5/5  Reflexes:    Right Quadriceps reflex2+  Left Quadriceps reflex2+  Right Achilles reflex2+  Left Achilles reflex2+  Gait:antalgic with a cane     Dermatology:     Skin:no unusual rashes and no skin lesions     Impression:  Right lower extremity pain with h/o psoriasis   Plan:  Follow up on his low back pain radiating down the right lower extremity. Results of lumbar spine MRI were discussed with the patient. Discussed treatment options including LESI at L4-5 right paramedian, will schedule it. OARRS report reviewed 08/2021. Patient encouraged to stay active. Treatment plan discussed with the patient including procedure side effects. We discussed with the patient that combining opioids, benzodiazepines, alcohol, illicit drugs or sleep aids increases the risk of respiratory depression including death. We discussed that these medications may cause drowsiness, sedation or dizziness and have counseled the patient not to drive or operate machinery. We have discussed that these medications will be prescribed only by one provider.  We have discussed with the patient about age related risk factors and have thoroughly discussed the importance of taking these medications as prescribed. The patient verbalizes understanding. hetal Laurent M.D.

## 2021-09-22 ENCOUNTER — HOSPITAL ENCOUNTER (OUTPATIENT)
Age: 86
Setting detail: OUTPATIENT SURGERY
Discharge: HOME | End: 2021-09-22
Attending: PAIN MEDICINE | Admitting: PAIN MEDICINE
Payer: MEDICARE

## 2021-09-22 ENCOUNTER — HOSPITAL ENCOUNTER (OUTPATIENT)
Dept: OPERATING ROOM | Age: 86
Setting detail: OUTPATIENT SURGERY
Discharge: HOME | End: 2021-09-22
Attending: PAIN MEDICINE
Payer: MEDICARE

## 2021-09-22 VITALS
RESPIRATION RATE: 18 BRPM | OXYGEN SATURATION: 96 % | DIASTOLIC BLOOD PRESSURE: 78 MMHG | BODY MASS INDEX: 25.11 KG/M2 | SYSTOLIC BLOOD PRESSURE: 168 MMHG | HEART RATE: 74 BPM | WEIGHT: 160 LBS | HEIGHT: 67 IN

## 2021-09-22 DIAGNOSIS — M54.16 LUMBAR RADICULOPATHY: ICD-10-CM

## 2021-09-22 PROCEDURE — 2500000003 HC RX 250 WO HCPCS: Performed by: PAIN MEDICINE

## 2021-09-22 PROCEDURE — 62323 NJX INTERLAMINAR LMBR/SAC: CPT | Performed by: PAIN MEDICINE

## 2021-09-22 PROCEDURE — 2709999900 HC NON-CHARGEABLE SUPPLY: Performed by: PAIN MEDICINE

## 2021-09-22 PROCEDURE — 3209999900 FLUORO FOR SURGICAL PROCEDURES

## 2021-09-22 PROCEDURE — 7100000011 HC PHASE II RECOVERY - ADDTL 15 MIN: Performed by: PAIN MEDICINE

## 2021-09-22 PROCEDURE — 6360000002 HC RX W HCPCS: Performed by: PAIN MEDICINE

## 2021-09-22 PROCEDURE — 3600000005 HC SURGERY LEVEL 5 BASE: Performed by: PAIN MEDICINE

## 2021-09-22 PROCEDURE — 7100000010 HC PHASE II RECOVERY - FIRST 15 MIN: Performed by: PAIN MEDICINE

## 2021-09-22 PROCEDURE — 6360000004 HC RX CONTRAST MEDICATION: Performed by: PAIN MEDICINE

## 2021-09-22 RX ORDER — LIDOCAINE HYDROCHLORIDE 5 MG/ML
INJECTION, SOLUTION INFILTRATION; INTRAVENOUS PRN
Status: DISCONTINUED | OUTPATIENT
Start: 2021-09-22 | End: 2021-09-22 | Stop reason: ALTCHOICE

## 2021-09-22 ASSESSMENT — PAIN DESCRIPTION - DESCRIPTORS: DESCRIPTORS: DISCOMFORT;JABBING

## 2021-09-22 ASSESSMENT — PAIN SCALES - GENERAL
PAINLEVEL_OUTOF10: 0
PAINLEVEL_OUTOF10: 0

## 2021-09-22 ASSESSMENT — PAIN - FUNCTIONAL ASSESSMENT: PAIN_FUNCTIONAL_ASSESSMENT: 0-10

## 2021-09-22 NOTE — OP NOTE
2021    Patient: Gely Mcleod  :  1935  Age:  80 y.o. Sex:  male     PRE-OPERATIVE DIAGNOSIS: Lumbar disc displacement, lumbar radiculopathy. POST-OPERATIVE DIAGNOSIS: Same. PROCEDURE: Fluoroscopic guided therapeutic lumbar epidural steroid injection at the L4-5 level (# 1). SURGEON: HUNTER Ramires M.D.. ANESTHESIA: Local    ESTIMATED BLOOD LOSS: None.  ______________________________________________________________________    BRIEF HISTORY:  Gely Mcleod comes in today for first lumbar epidural injection at L4-5 level. The potential complications of this procedure were discussed with him again today. He has elected to undergo the aforementioned procedure. Aurelios complete History & Physical examination were reviewed in depth, a copy of which is in the chart. DESCRIPTION OF PROCEDURE:    After confirming written and informed consent, a time-out was performed and Aurelios name and date of birth, the procedure to be performed as well as the plan for the location of the needle insertion were confirmed. The patient was brought into the procedure room and placed in the prone position on the fluoroscopy table. A pillow was placed under the patient's lower abdomen/upper pelvis to increase lumbar interlaminar space. Standard monitors were placed, and vital signs were observed throughout the procedure. The area of the lumbar spine was prepped with chloraprep and draped in a sterile manner. The L4-5 interspace was identified and marked under AP fluoroscopy. The skin and subcutaneous tissues at the above level were anesthestized with 0.5% lidocaine. With intermittent fluoroscopy, an # 18 gauge 3 1/2 tuohy epidural needle was inserted and directed toward the interlaminar space. The needle was slowly advanced using loss of resistance technique and 5 cc glass syringe  until the tip of the epidural needle has passed through the ligamentum flavum and entered the epidural space.  AP and lateral fluoroscopic imaging is performed to verify that the epidural needle is properly placed. Negative aspiration of blood and CSF was confirmed. 0.5 ml of omnipaque 240 was used for confirmation of even epidural spread under both live and AP fluoroscopy. After negative aspiration, a solution of 0.5 % Lidocaine 1 ml and 80 mg DepoMedrol was easily injected. The needle was gently removed intact . The patient back was cleaned and a Band-Aid was placed over the needle insertion point. Disposition the patient tolerated the procedure well and there were no complications . Vital signs remained stable throughout the procedure. The patient was escorted to the recovery area where they remained until discharge and written discharge instructions for the procedure were given. Plan: Teddy Bamberger will return to our pain management center as scheduled.      Oralia Mendosa MD

## 2021-09-22 NOTE — H&P
[address]  [address] [facility], [address] [place] [address] Dorian  [phone]    Procedure History & Physical      Lugenia Bibber     HPI:    Patient  is here for low back and right lower extremity pain for LESI L4-5  Labs/imaging studies reviewed   All question and concerns addressed including R/B/A associated with the procedure    Past Medical History:   Diagnosis Date    Cancer [facility])     prostate-post seed implants 2003    Diverticulosis     GERD (gastroesophageal reflux disease)     Hemorrhoids     Hiatal hernia     Hyperlipidemia     Osteoarthritis     Psoriasis        Past Surgical History:   Procedure Laterality Date    CARDIAC CATHETERIZATION  2007    COLONOSCOPY  2011       Prior to Admission medications    Medication Sig Start Date End Date Taking? Authorizing Provider   ibuprofen (ADVIL;MOTRIN) 200 MG tablet Take 200 mg by mouth every 6 hours as needed for Pain   Yes Historical Provider, MD   clobetasol (TEMOVATE) 0.05 % cream  21  Yes Historical Provider, MD   predniSONE (DELTASONE) 10 MG tablet Take 1 tablet by mouth daily 21  Yes Asha Naranjo MD   Ustekinumab (STELARA SC) Inject 1 Syringe into the skin every 3 months   Yes Historical Provider, MD       No Known Allergies    Social History     Socioeconomic History    Marital status:      Spouse name: Not on file    Number of children: Not on file    Years of education: Not on file    Highest education level: Not on file   Occupational History    Not on file   Tobacco Use    Smoking status: Former Smoker     Packs/day: 0.25     Years: 5.00     Pack years: 1.25     Types: Cigarettes     Quit date: 2000     Years since quittin.7    Smokeless tobacco: Never Used   Vaping Use    Vaping Use: Never used   Substance and Sexual Activity    Alcohol use:  Yes     Alcohol/week: 21.0 standard drinks     Types: 21 Cans of beer per week    Drug use: No    Sexual activity: Not on file   Other Topics Concern    Not on file   Social History Narrative    Not on file     Social Determinants of Health     Financial Resource Strain:     Difficulty of Paying Living Expenses:    Food Insecurity: No Food Insecurity    Worried About Running Out of Food in the Last Year: Never true    Isiah of Food in the Last Year: Never true   Transportation Needs: No Transportation Needs    Lack of Transportation (Medical): No    Lack of Transportation (Non-Medical):  No   Physical Activity:     Days of Exercise per Week:     Minutes of Exercise per Session:    Stress:     Feeling of Stress :    Social Connections:     Frequency of Communication with Friends and Family:     Frequency of Social Gatherings with Friends and Family:     Attends Pentecostalism Services:     Active Member of Clubs or Organizations:     Attends Club or Organization Meetings:     Marital Status:    Intimate Partner Violence:     Fear of Current or Ex-Partner:     Emotionally Abused:     Physically Abused:     Sexually Abused:        Family History   Problem Relation Age of Onset    No Known Problems Mother     Heart Attack Father     Heart Disease Father     No Known Problems Sister     No Known Problems Brother     No Known Problems Maternal Aunt     Heart Attack Maternal Uncle     No Known Problems Paternal Aunt     No Known Problems Paternal Uncle     No Known Problems Maternal Grandmother     No Known Problems Maternal Grandfather     No Known Problems Paternal Grandmother     No Known Problems Paternal Grandfather     No Known Problems Maternal Cousin     No Known Problems Paternal Cousin     Diabetes Grandchild     No Known Problems Sister     Cancer Sister         breast    Heart Disease Sister     Heart Attack Sister     Other Brother         collapsed lung    Cancer Brother         patient doesn't know type    Alzheimer's Disease Brother          REVIEW OF SYSTEMS:    CONSTITUTIONAL:

## 2021-10-05 ENCOUNTER — OFFICE VISIT (OUTPATIENT)
Dept: PAIN MANAGEMENT | Age: 86
End: 2021-10-05
Payer: MEDICARE

## 2021-10-05 VITALS
BODY MASS INDEX: 25.11 KG/M2 | WEIGHT: 160 LBS | HEIGHT: 67 IN | DIASTOLIC BLOOD PRESSURE: 84 MMHG | RESPIRATION RATE: 16 BRPM | TEMPERATURE: 97.1 F | OXYGEN SATURATION: 94 % | HEART RATE: 71 BPM | SYSTOLIC BLOOD PRESSURE: 122 MMHG

## 2021-10-05 DIAGNOSIS — M51.9 LUMBAR DISC DISORDER: ICD-10-CM

## 2021-10-05 DIAGNOSIS — M48.061 SPINAL STENOSIS OF LUMBAR REGION WITHOUT NEUROGENIC CLAUDICATION: ICD-10-CM

## 2021-10-05 DIAGNOSIS — M47.816 LUMBAR SPONDYLOSIS: Primary | ICD-10-CM

## 2021-10-05 DIAGNOSIS — G89.4 CHRONIC PAIN SYNDROME: ICD-10-CM

## 2021-10-05 DIAGNOSIS — M54.16 LUMBAR RADICULOPATHY: ICD-10-CM

## 2021-10-05 PROCEDURE — 99213 OFFICE O/P EST LOW 20 MIN: CPT | Performed by: PAIN MEDICINE

## 2021-10-05 NOTE — PROGRESS NOTES
Do you currently have any of the following:    Fever: No  Headache:  No  Cough: No  Shortness of breath: No  Exposed to anyone with these symptoms: No                                                                                                                Tirso Donovan presents to the [facility] on 10/5/2021. Tariq Farfan is complaining of pain in lower back, right side, and right foot goes numb. . The pain is intermittent. The pain is described as shooting. Pain is rated on his best day at a 1, on his worst day at a 4, and on average at a 2 on the VAS scale. He took his last dose of nothing at this time . Tariq Farfan does not have issues with constipation. Any procedures since your last visit: yes, with 90 % relief. He was planting garlic in the garden and cut the grass. Getting ready to go hunting. He is not on NSAIDS and  is not on anticoagulation medications to include none and is managed by NA. Pacemaker or defibrillator: No Physician managing device is NA. Medication Contract and Consent for Opioid Use Documents Filed      No documents found                   /84   Pulse 71   Temp 97.1 °F (36.2 °C) (Infrared)   Resp 16   Ht 5' 7\" (1.702 m)   Wt 160 lb (72.6 kg)   SpO2 94%   BMI 25.06 kg/m²      No LMP for male patient.

## 2021-10-05 NOTE — PROGRESS NOTES
[address] [facility], [address] [place] [address] Dorian  [phone]    Follow up Note      Yusra Armstrong     Date of Visit:  10/5/2021    CC:  Patient presents for follow up   Chief Complaint   Patient presents with    Follow Up After Procedure      Fluoroscopic guided therapeutic lumbar epidural steroid injection at the L4-5 level (# 1). HPI:    Pain is better  Appropriate analgesia with current medications regimen: N/A. Change in quality of symptoms:no. Medication side effects:not applicable . Recent diagnostic testing:none  Recent interventional procedures:LESI L4-5 excellent. He has not been on anticoagulation medications to include none. The patient  has not been on herbal supplements. The patient is not diabetic.     Imaging:   Lumbar spine Xray severe degenerative changes L1-2 with L5-S1 foraminal stenosis. Lumbar spine MRI 2021  Multilevel degenerative disc disease and multilevel degenerative facet   hypertrophy with mild canal stenosis at L4-5.       Bilateral foraminal narrowing     Previous treatments: medications. .        Potential Aberrant Drug-Related Behavior:    No    Urine Drug Screening:  None    OARRS report:  10/2021 consistent    Opioid Agreement:  Date enacted:N/A  Renewal date:N/A    Past Medical History:   Diagnosis Date    Cancer [facility]) 2003    prostate-post seed implants 2003    Diverticulosis     GERD (gastroesophageal reflux disease)     Hemorrhoids     Hiatal hernia     Hyperlipidemia     Osteoarthritis     Psoriasis      Past Surgical History:   Procedure Laterality Date    CARDIAC CATHETERIZATION  03/2007    COLONOSCOPY  08/2011    PAIN MANAGEMENT PROCEDURE Right 9/22/2021    LUMBAR EPIDURAL STEROID INJECTION L4-5 RIGHT PARAMEDIAN WITH 80 DEPO performed by Pam Lee MD at [address] [place] [address]     Prior to Admission medications    Medication Sig Start Date End Date Taking?  Authorizing Provider   clobetasol (Bhavani Nails) 0.05 % cream  21  Yes Historical Provider, MD   Ustekinumab (STELARA SC) Inject 1 Syringe into the skin every 3 months   Yes Historical Provider, MD   ibuprofen (ADVIL;MOTRIN) 200 MG tablet Take 200 mg by mouth every 6 hours as needed for Pain  Patient not taking: Reported on 10/5/2021    Historical Provider, MD   predniSONE (DELTASONE) 10 MG tablet Take 1 tablet by mouth daily  Patient not taking: Reported on 10/5/2021 5/13/21   Denise Gómez MD     No Known Allergies    Social History     Socioeconomic History    Marital status:      Spouse name: Not on file    Number of children: Not on file    Years of education: Not on file    Highest education level: Not on file   Occupational History    Not on file   Tobacco Use    Smoking status: Former Smoker     Packs/day: 0.25     Years: 5.00     Pack years: 1.25     Types: Cigarettes     Quit date: 2000     Years since quittin.7    Smokeless tobacco: Never Used   Vaping Use    Vaping Use: Never used   Substance and Sexual Activity    Alcohol use: Yes     Alcohol/week: 21.0 standard drinks     Types: 21 Cans of beer per week    Drug use: No    Sexual activity: Not on file   Other Topics Concern    Not on file   Social History Narrative    Not on file     Social Determinants of Health     Financial Resource Strain:     Difficulty of Paying Living Expenses:    Food Insecurity: No Food Insecurity    Worried About Running Out of Food in the Last Year: Never true    Isiah of Food in the Last Year: Never true   Transportation Needs: No Transportation Needs    Lack of Transportation (Medical): No    Lack of Transportation (Non-Medical):  No   Physical Activity:     Days of Exercise per Week:     Minutes of Exercise per Session:    Stress:     Feeling of Stress :    Social Connections:     Frequency of Communication with Friends and Family:     Frequency of Social Gatherings with Friends and Family:     Attends Scientology Services:     Active Member of Clubs or Organizations:     Attends Club or Organization Meetings:     Marital Status:    Intimate Partner Violence:     Fear of Current or Ex-Partner:     Emotionally Abused:     Physically Abused:     Sexually Abused:      Family History   Problem Relation Age of Onset    No Known Problems Mother     Heart Attack Father     Heart Disease Father     No Known Problems Sister     No Known Problems Brother     No Known Problems Maternal Aunt     Heart Attack Maternal Uncle     No Known Problems Paternal Aunt     No Known Problems Paternal Uncle     No Known Problems Maternal Grandmother     No Known Problems Maternal Grandfather     No Known Problems Paternal Grandmother     No Known Problems Paternal Grandfather     No Known Problems Maternal Cousin     No Known Problems Paternal Cousin     Diabetes Grandchild     No Known Problems Sister     Cancer Sister         breast    Heart Disease Sister     Heart Attack Sister     Other Brother         collapsed lung    Cancer Brother         patient doesn't know type    Alzheimer's Disease Brother      REVIEW OF SYSTEMS:     Sheryl Bean denies fever/chills, chest pain, shortness of breath, new bowel or bladder complaints. All other review of systems was negative. PHYSICAL EXAMINATION:      /84   Pulse 71   Temp 97.1 °F (36.2 °C) (Infrared)   Resp 16   Ht 5' 7\" (1.702 m)   Wt 160 lb (72.6 kg)   SpO2 94%   BMI 25.06 kg/m²     General:       General appearance:   elderly, pleasant and well-hydrated. , in mild discomfort and A & O x3  Build:Normal Weight     HEENT:     Head:normocephalic and atraumatic  Sclera: icterus absent,      Lungs:     Breathing:Breathing Pattern: regular, no distress     Abdomen:     Shape:non-distended and normal  Tenderness:none     Lumbar spine:     Spine inspection:normal   CVA tenderness:No   Palpation:facet loading, left, right and negative.   Range of motion:abnormal moderately Lateral bending, flexion, extension rotation bilateral and is  painful.     Musculoskeletal:     Trigger points in Paraveteral:absent bilaterally  SI joint tenderness:negative right, negative left  SLR:positve right, negative left, sitting      Extremities:     Tremors:None bilaterally upper and lower  Range of motion:pain with internal rotation of hips negative. Intact:Yes  Edema:Normal     Neurological:     Sensory:normal to light touch bilateral lower extremities. Motor:                           Right Quadriceps5/5          Left Quadriceps5/5           Right Gastrocnemius5/5    Left Gastrocnemius5/5  Right Ant Tibialis5/5  Left Ant Tibialis5/5  Reflexes:    Right Quadriceps reflex2+  Left Quadriceps reflex2+  Right Achilles reflex2+  Left Achilles reflex2+  Gait:antalgic     Dermatology:     Skin:no unusual rashes and no skin lesions     Impression:  Right lower extremity pain with h/o psoriasis   Plan:  Follow up on his low back pain radiating down the right lower extremity with no acute issues. Patient is s/p LESI L4-5 with excellent relief, will repeat as needed. OARRS report reviewed 10/2021. Patient encouraged to stay active. Treatment plan discussed with the patient. We discussed with the patient that combining opioids, benzodiazepines, alcohol, illicit drugs or sleep aids increases the risk of respiratory depression including death. We discussed that these medications may cause drowsiness, sedation or dizziness and have counseled the patient not to drive or operate machinery. We have discussed that these medications will be prescribed only by one provider. We have discussed with the patient about age related risk factors and have thoroughly discussed the importance of taking these medications as prescribed. The patient verbalizes understanding. ccrefmarquitaing patrick Grajeda M.D.

## 2021-11-23 ENCOUNTER — OFFICE VISIT (OUTPATIENT)
Dept: FAMILY MEDICINE CLINIC | Age: 86
End: 2021-11-23
Payer: MEDICARE

## 2021-11-23 VITALS
WEIGHT: 162 LBS | TEMPERATURE: 97 F | SYSTOLIC BLOOD PRESSURE: 122 MMHG | OXYGEN SATURATION: 98 % | DIASTOLIC BLOOD PRESSURE: 84 MMHG | HEART RATE: 90 BPM | BODY MASS INDEX: 25.37 KG/M2

## 2021-11-23 DIAGNOSIS — M54.16 LUMBAR RADICULOPATHY: Primary | ICD-10-CM

## 2021-11-23 DIAGNOSIS — Z23 NEED FOR IMMUNIZATION AGAINST INFLUENZA: ICD-10-CM

## 2021-11-23 DIAGNOSIS — L40.50 PSORIATIC ARTHRITIS (HCC): ICD-10-CM

## 2021-11-23 DIAGNOSIS — C61 PROSTATE CANCER (HCC): ICD-10-CM

## 2021-11-23 DIAGNOSIS — R29.898 WEAKNESS OF RIGHT LEG: ICD-10-CM

## 2021-11-23 PROCEDURE — 99213 OFFICE O/P EST LOW 20 MIN: CPT | Performed by: INTERNAL MEDICINE

## 2021-11-23 PROCEDURE — G0008 ADMIN INFLUENZA VIRUS VAC: HCPCS | Performed by: INTERNAL MEDICINE

## 2021-11-23 PROCEDURE — 90694 VACC AIIV4 NO PRSRV 0.5ML IM: CPT | Performed by: INTERNAL MEDICINE

## 2021-11-23 NOTE — PROGRESS NOTES
Subjective:     Chief Complaint   Patient presents with    3 Month Follow-Up   Patient here for follow-up on the lumbar radiculopathy he was referred to pain clinic he received epidural which has helped his right leg is better and stronger    Arthritis profile came back negative  He is able to walk better he will follow with the pain clinic    Weakness in the leg is improved    Has history of prostate cancer his PSA was less than 0.03  Arthritis profile was negative    Has history of psoriasis with psoriatic arthritis he is getting injections Stelara every 3 months and doing better with that    Past Medical History:   Diagnosis Date    Cancer [facility]) 2003    prostate-post seed implants 2003    Diverticulosis     GERD (gastroesophageal reflux disease)     Hemorrhoids     Hiatal hernia     Hyperlipidemia     Osteoarthritis     Psoriasis         Social History     Socioeconomic History    Marital status:      Spouse name: Not on file    Number of children: Not on file    Years of education: Not on file    Highest education level: Not on file   Occupational History    Not on file   Tobacco Use    Smoking status: Former Smoker     Packs/day: 0.25     Years: 5.00     Pack years: 1.25     Types: Cigarettes     Quit date: 2000     Years since quittin.9    Smokeless tobacco: Never Used   Vaping Use    Vaping Use: Never used   Substance and Sexual Activity    Alcohol use:  Yes     Alcohol/week: 21.0 standard drinks     Types: 21 Cans of beer per week    Drug use: No    Sexual activity: Not on file   Other Topics Concern    Not on file   Social History Narrative    Not on file     Social Determinants of Health     Financial Resource Strain:     Difficulty of Paying Living Expenses: Not on file   Food Insecurity: No Food Insecurity    Worried About Running Out of Food in the Last Year: Never true    Isiah of Food in the Last Year: Never true   Transportation Needs: No Transportation Needs    Lack of Transportation (Medical): No    Lack of Transportation (Non-Medical):  No   Physical Activity:     Days of Exercise per Week: Not on file    Minutes of Exercise per Session: Not on file   Stress:     Feeling of Stress : Not on file   Social Connections:     Frequency of Communication with Friends and Family: Not on file    Frequency of Social Gatherings with Friends and Family: Not on file    Attends Orthodoxy Services: Not on file    Active Member of Clubs or Organizations: Not on file    Attends Club or Organization Meetings: Not on file    Marital Status: Not on file   Intimate Partner Violence:     Fear of Current or Ex-Partner: Not on file    Emotionally Abused: Not on file    Physically Abused: Not on file    Sexually Abused: Not on file   Housing Stability:     Unable to Pay for Housing in the Last Year: Not on file    Number of [place] in the Last Year: Not on file    Unstable Housing in the Last Year: Not on file        Past Surgical History:   Procedure Laterality Date    CARDIAC CATHETERIZATION  03/2007    COLONOSCOPY  08/2011    PAIN MANAGEMENT PROCEDURE Right 9/22/2021    LUMBAR EPIDURAL STEROID INJECTION L4-5 RIGHT PARAMEDIAN WITH 80 DEPO performed by Hannah Peterson MD at [address] [place] [address]        Family History   Problem Relation Age of Onset    No Known Problems Mother     Heart Attack Father     Heart Disease Father     No Known Problems Sister     No Known Problems Brother     No Known Problems Maternal Aunt     Heart Attack Maternal Uncle     No Known Problems Paternal Aunt     No Known Problems Paternal Uncle     No Known Problems Maternal Grandmother     No Known Problems Maternal Grandfather     No Known Problems Paternal Grandmother     No Known Problems Paternal Grandfather     No Known Problems Maternal Cousin     No Known Problems Paternal Cousin     Diabetes Grandchild     No Known Problems Sister     Cancer Sister         breast    Heart Disease Sister     Heart Attack Sister     Other Brother         collapsed lung    Cancer Brother         patient doesn't know type    Alzheimer's Disease Brother         No Known Allergies     ROS  No acute distress  Cardiac: Denies any chest pain or palpitation  Seen by Dr. Ellyn Borrego in June 2021  Advised follow-up as needed  Cardiac cath March 2007 -  Respiratory: Denies any cough or shortness of breath  GI: No abdominal pain. Denies any nausea vomiting or diarrhea  CT abdomen pelvis July 2018 -  Colonoscopy August 2011 no polyp rock Dr. Frederic Seth  Endoscopy September 2016  : Denies any dysuria frequency or hematuria  History of prostate cancer  Neuro: No headache or dizziness  Endocrine: No diabetes  Skin: normal  No recent weight gain or weight loss  Denies any change in vision    Objective:    /84   Pulse 90   Temp 97 °F (36.1 °C)   Wt 162 lb (73.5 kg)   SpO2 98%   BMI 25.37 kg/m²     Constitutional: Alert awake and oriented  Eyes: Pupils equal bilaterally. Extraocular muscles intact  Neck: no JVD adenopathy no bruit  Heart:  RRR, no murmurs, gallops, or rubs. Lungs:    no wheeze, rales or rhonchi  Abd: bowel sounds present, nontender, nondistended, no masses  Extrem:  No clubbing, cyanosis, or edema  Neuro: AAOx3,No Focal deficit  Psychological: no depression or anxiety       Current Outpatient Medications   Medication Sig Dispense Refill    clobetasol (TEMOVATE) 0.05 % cream       Ustekinumab (STELARA SC) Inject 1 Syringe into the skin every 3 months       No current facility-administered medications for this visit.         Last 3 BMP  Lab Results   Component Value Date/Time     05/13/2021 02:45 PM     02/02/2021 12:36 PM     11/06/2019 01:46 PM    K 4.4 05/13/2021 02:45 PM    K 4.5 02/02/2021 12:36 PM    K 4.3 06/09/2020 12:00 AM    K 4.0 11/06/2019 01:46 PM     05/13/2021 02:45 PM     02/02/2021 12:36 PM     11/06/2019 01:46 PM    CO2 23 05/13/2021 02:45 PM    CO2 24 02/02/2021 12:36 PM    CO2 26 11/06/2019 01:46 PM    BUN 16 05/13/2021 02:45 PM    BUN 10 02/02/2021 12:36 PM    BUN 12 11/06/2019 01:46 PM    CREATININE 1.1 05/13/2021 02:45 PM    CREATININE 0.8 02/02/2021 12:36 PM    CREATININE 0.9 06/09/2020 12:00 AM    CREATININE 1.1 11/06/2019 01:46 PM    CREATININE 1.0 07/03/2019 12:00 AM    GLUCOSE 111 (H) 05/13/2021 02:45 PM    GLUCOSE 127 (H) 02/02/2021 12:36 PM    GLUCOSE 92 11/06/2019 01:46 PM    GLUCOSE 99 03/23/2012 08:45 AM    CALCIUM 10.0 05/13/2021 02:45 PM    CALCIUM 9.6 02/02/2021 12:36 PM    CALCIUM 9.4 11/06/2019 01:46 PM       Last 3 CMP:    Lab Results   Component Value Date/Time     05/13/2021 02:45 PM     02/02/2021 12:36 PM     11/06/2019 01:46 PM    K 4.4 05/13/2021 02:45 PM    K 4.5 02/02/2021 12:36 PM    K 4.3 06/09/2020 12:00 AM    K 4.0 11/06/2019 01:46 PM     05/13/2021 02:45 PM     02/02/2021 12:36 PM     11/06/2019 01:46 PM    CO2 23 05/13/2021 02:45 PM    CO2 24 02/02/2021 12:36 PM    CO2 26 11/06/2019 01:46 PM    BUN 16 05/13/2021 02:45 PM    BUN 10 02/02/2021 12:36 PM    BUN 12 11/06/2019 01:46 PM    CREATININE 1.1 05/13/2021 02:45 PM    CREATININE 0.8 02/02/2021 12:36 PM    CREATININE 0.9 06/09/2020 12:00 AM    CREATININE 1.1 11/06/2019 01:46 PM    CREATININE 1.0 07/03/2019 12:00 AM    GLUCOSE 111 (H) 05/13/2021 02:45 PM    GLUCOSE 127 (H) 02/02/2021 12:36 PM    GLUCOSE 92 11/06/2019 01:46 PM    GLUCOSE 99 03/23/2012 08:45 AM    CALCIUM 10.0 05/13/2021 02:45 PM    CALCIUM 9.6 02/02/2021 12:36 PM    CALCIUM 9.4 11/06/2019 01:46 PM    PROT 7.3 05/13/2021 02:45 PM    PROT 7.1 02/02/2021 12:36 PM    PROT 6.9 11/06/2019 01:46 PM    LABALBU 4.4 05/13/2021 02:45 PM    LABALBU 4.3 02/02/2021 12:36 PM    LABALBU 4.0 11/06/2019 01:46 PM    LABALBU 4.2 03/23/2012 08:45 AM    BILITOT 0.6 05/13/2021 02:45 PM    BILITOT 0.5 02/02/2021 12:36 PM    BILITOT 0.5 11/06/2019 01:46 PM    ALKPHOS 78 05/13/2021 02:45 PM    ALKPHOS 81 02/02/2021 12:36 PM    ALKPHOS 90 11/06/2019 01:46 PM    AST 18 05/13/2021 02:45 PM    AST 17 02/02/2021 12:36 PM    AST 20 11/06/2019 01:46 PM    ALT 9 05/13/2021 02:45 PM    ALT 9 02/02/2021 12:36 PM    ALT 10 11/06/2019 01:46 PM        CBC:   Lab Results   Component Value Date/Time    WBC 8.0 02/02/2021 12:36 PM    RBC 4.77 02/02/2021 12:36 PM    HGB 13.1 02/02/2021 12:36 PM    HCT 41.1 02/02/2021 12:36 PM    MCV 86.2 02/02/2021 12:36 PM    MCH 27.5 02/02/2021 12:36 PM    MCHC 31.9 (L) 02/02/2021 12:36 PM    RDW 14.0 02/02/2021 12:36 PM     02/02/2021 12:36 PM    MPV 10.3 02/02/2021 12:36 PM       A1C:  Lab Results   Component Value Date/Time    LABA1C 5.5 06/09/2020 12:00 AM       Lipid panel:  Lab Results   Component Value Date    CHOL 175 02/02/2021    CHOL 162 06/09/2020    CHOL 164 07/03/2019    TRIG 65 02/02/2021    TRIG 54 06/09/2020    TRIG 66 07/03/2019    HDL 88 02/02/2021    HDL 85 06/09/2020    HDL 66 07/03/2019        Lab Results   Component Value Date/Time    PROT 7.3 05/13/2021 02:45 PM    PROT 7.1 02/02/2021 12:36 PM    PROT 6.9 11/06/2019 01:46 PM       No results found for: MG      Assessment. Carmel Winkler was seen today for 3 month follow-up.     Diagnoses and all orders for this visit:    Need for immunization against influenza  -     INFLUENZA, QUADV, ADJUVANTED, 72 YRS =, IM, PF, PREFILL SYR, 0.5ML (FLUAD)    Weakness of right leg    Lumbar radiculopathy    Psoriatic arthritis (HCC)    Prostate cancer ([facility]       Patient Active Problem List   Diagnosis    [facility]    Psoriasis    Arthritis    Neuropathy    Hyperlipidemia    Lumbar radiculopathy    Weakness of right leg    Generalized anxiety disorder    Psoriatic arthritis ([address]    Spinal stenosis of lumbar region without neurogenic claudication    Lumbar spondylosis    Lumbar disc disorder    Chronic pain syndrome       Plan: Lumbar radiculopathy improved after epidural follow-up pain clinic local exercises discussed    Weakness in the right leg improved fall precautions discussed    Sciatica arthritis follow with rheumatologist Dr. Stephen Lopez    Prostate cancer last PSA less than 0.03  Stable    I am retiring he will see another physician next visit    Return in about 4 months (around 3/23/2022).        Rafael Bruce MD  12:47 PM  11/23/2021     DE

## 2022-01-07 ENCOUNTER — OFFICE VISIT (OUTPATIENT)
Dept: PAIN MANAGEMENT | Age: 87
End: 2022-01-07
Payer: MEDICARE

## 2022-01-07 VITALS
HEIGHT: 67 IN | BODY MASS INDEX: 25.43 KG/M2 | DIASTOLIC BLOOD PRESSURE: 74 MMHG | OXYGEN SATURATION: 97 % | TEMPERATURE: 96.9 F | RESPIRATION RATE: 16 BRPM | HEART RATE: 66 BPM | SYSTOLIC BLOOD PRESSURE: 116 MMHG | WEIGHT: 162 LBS

## 2022-01-07 DIAGNOSIS — M47.816 LUMBAR SPONDYLOSIS: ICD-10-CM

## 2022-01-07 DIAGNOSIS — M54.16 LUMBAR RADICULOPATHY: Primary | ICD-10-CM

## 2022-01-07 DIAGNOSIS — M51.9 LUMBAR DISC DISORDER: ICD-10-CM

## 2022-01-07 DIAGNOSIS — G89.4 CHRONIC PAIN SYNDROME: ICD-10-CM

## 2022-01-07 DIAGNOSIS — M19.049 ARTHRITIS OF HAND: ICD-10-CM

## 2022-01-07 DIAGNOSIS — M48.061 SPINAL STENOSIS OF LUMBAR REGION WITHOUT NEUROGENIC CLAUDICATION: ICD-10-CM

## 2022-01-07 PROCEDURE — 99213 OFFICE O/P EST LOW 20 MIN: CPT | Performed by: PAIN MEDICINE

## 2022-01-07 PROCEDURE — 99214 OFFICE O/P EST MOD 30 MIN: CPT | Performed by: PAIN MEDICINE

## 2022-01-07 RX ORDER — IBUPROFEN 200 MG
200 TABLET ORAL 2 TIMES DAILY PRN
COMMUNITY

## 2022-01-07 RX ORDER — CYANOCOBALAMIN (VITAMIN B-12) 500 MCG
TABLET ORAL
COMMUNITY

## 2022-01-07 RX ORDER — FUROSEMIDE 20 MG/1
TABLET ORAL
COMMUNITY
Start: 2021-12-16

## 2022-01-07 RX ORDER — MELOXICAM 7.5 MG/1
7.5 TABLET ORAL DAILY
Qty: 30 TABLET | Refills: 0 | Status: SHIPPED | OUTPATIENT
Start: 2022-01-07 | End: 2022-02-06

## 2022-01-07 NOTE — PROGRESS NOTES
Do you currently have any of the following:    Fever: No  Headache:  No  Cough: No  Shortness of breath: No  Exposed to anyone with these symptoms: Catherine Chong presents to the [address] on 1/7/2022. Irena Manuel is complaining of pain in hands and feet. . The pain is constant. The pain is described as aching and throbbing. Pain is rated on his best day at a 2, on his worst day at a 9, and on average at a 6 on the VAS scale. He took his last dose of Motrin . Irena Manuel does not have issues with constipation. Any procedures since your last visit: No,    He is  on NSAIDS and  is not on anticoagulation medications to include none and is managed by NA. Pacemaker or defibrillator: No Physician managing device is NA. Medication Contract and Consent for Opioid Use Documents Filed      No documents found                   /74   Pulse 66   Temp 96.9 °F (36.1 °C) (Infrared)   Resp 16   Ht 5' 7\" (1.702 m)   Wt 162 lb (73.5 kg)   SpO2 97%   BMI 25.37 kg/m²      No LMP for male patient.

## 2022-01-07 NOTE — PROGRESS NOTES
[address]  [address] [facility], [address] [place] [address] Dorian  [phone]    Follow up Note      Eric Aviles     Date of Visit:  1/7/2022    CC:  Patient presents for follow up   Chief Complaint   Patient presents with    Follow-up     doing well with lower back pain, but feet are numb off and on, hands are swollen     HPI:    Low back pain continues to feel better/increased hand pain  Appropriate analgesia with current medications regimen: N/A. Change in quality of symptoms:no. Medication side effects:not applicable . Recent diagnostic testing:none  Recent interventional procedures:none    He has not been on anticoagulation medications to include none. The patient  has not been on herbal supplements. The patient is not diabetic.     Imaging:   Lumbar spine Xray severe degenerative changes L1-2 with L5-S1 foraminal stenosis. Lumbar spine MRI 2021  Multilevel degenerative disc disease and multilevel degenerative facet   hypertrophy with mild canal stenosis at L4-5.       Bilateral foraminal narrowing     Previous treatments: medications. .        Potential Aberrant Drug-Related Behavior:    No    Urine Drug Screening:  None    OARRS report:  01/2022 consistent    Opioid Agreement:  Date enacted:N/A  Renewal date:N/A    Past Medical History:   Diagnosis Date    Cancer [facility]) 2003    prostate-post seed implants 2003    Diverticulosis     GERD (gastroesophageal reflux disease)     Hemorrhoids     Hiatal hernia     Hyperlipidemia     Osteoarthritis     Psoriasis      Past Surgical History:   Procedure Laterality Date    CARDIAC CATHETERIZATION  03/2007    COLONOSCOPY  08/2011    PAIN MANAGEMENT PROCEDURE Right 9/22/2021    LUMBAR EPIDURAL STEROID INJECTION L4-5 RIGHT PARAMEDIAN WITH 80 DEPO performed by Toi Yuan MD at [address] [place] [address]     Prior to Admission medications    Medication Sig Start Date End Date Taking?  Authorizing Provider   furosemide (LASIX) 20 MG tablet TAKE 1 TABLET BY MOUTH ONCE DAILY 21   Historical Provider, MD   clobetasol (TEMOVATE) 0.05 % cream  21   Historical Provider, MD   Ustekinumab (STELARA SC) Inject 1 Syringe into the skin every 3 months    Historical Provider, MD     No Known Allergies    Social History     Socioeconomic History    Marital status:      Spouse name: Not on file    Number of children: Not on file    Years of education: Not on file    Highest education level: Not on file   Occupational History    Not on file   Tobacco Use    Smoking status: Former Smoker     Packs/day: 0.25     Years: 5.00     Pack years: 1.25     Types: Cigarettes     Quit date: 2000     Years since quittin.0    Smokeless tobacco: Never Used   Vaping Use    Vaping Use: Never used   Substance and Sexual Activity    Alcohol use: Yes     Alcohol/week: 21.0 standard drinks     Types: 21 Cans of beer per week    Drug use: No    Sexual activity: Not on file   Other Topics Concern    Not on file   Social History Narrative    Not on file     Social Determinants of Health     Financial Resource Strain:     Difficulty of Paying Living Expenses: Not on file   Food Insecurity: No Food Insecurity    Worried About Running Out of Food in the Last Year: Never true    Isiah of Food in the Last Year: Never true   Transportation Needs: No Transportation Needs    Lack of Transportation (Medical): No    Lack of Transportation (Non-Medical):  No   Physical Activity:     Days of Exercise per Week: Not on file    Minutes of Exercise per Session: Not on file   Stress:     Feeling of Stress : Not on file   Social Connections:     Frequency of Communication with Friends and Family: Not on file    Frequency of Social Gatherings with Friends and Family: Not on file    Attends Hindu Services: Not on file    Active Member of Clubs or Organizations: Not on file    Attends Club or Organization Meetings: Not on file    Marital Status: Not on file   Intimate Partner Violence:     Fear of Current or Ex-Partner: Not on file    Emotionally Abused: Not on file    Physically Abused: Not on file    Sexually Abused: Not on file   Housing Stability:     Unable to Pay for Housing in the Last Year: Not on file    Number of Places Lived in the Last Year: Not on file    Unstable Housing in the Last Year: Not on file     Family History   Problem Relation Age of Onset    No Known Problems Mother     Heart Attack Father     Heart Disease Father     No Known Problems Sister     No Known Problems Brother     No Known Problems Maternal Aunt     Heart Attack Maternal Uncle     No Known Problems Paternal Aunt     No Known Problems Paternal Uncle     No Known Problems Maternal Grandmother     No Known Problems Maternal Grandfather     No Known Problems Paternal Grandmother     No Known Problems Paternal Grandfather     No Known Problems Maternal Cousin     No Known Problems Paternal Cousin     Diabetes Grandchild     No Known Problems Sister     Cancer Sister         breast    Heart Disease Sister     Heart Attack Sister     Other Brother         collapsed lung    Cancer Brother         patient doesn't know type    Alzheimer's Disease Brother      REVIEW OF SYSTEMS:     Saschamanpreet Kattymaria luz denies fever/chills, chest pain, shortness of breath, new bowel or bladder complaints. All other review of systems was negative. PHYSICAL EXAMINATION:      /74   Pulse 66   Temp 96.9 °F (36.1 °C) (Infrared)   Resp 16   Ht 5' 7\" (1.702 m)   Wt 162 lb (73.5 kg)   SpO2 97%   BMI 25.37 kg/m²     General:       General appearance:   elderly, pleasant and well-hydrated.    , in mild discomfort and A & O x3  Build:Normal Weight     HEENT:     Head:normocephalic and atraumatic  Sclera: icterus absent,      Lungs:     Breathing:Breathing Pattern: regular, no distress     Abdomen:     Shape:non-distended and normal  Tenderness:none     Lumbar spine:     Spine inspection:normal   CVA tenderness:No   Palpation:facet loading, left, right and negative. Range of motion:abnormal moderately Lateral bending, flexion, extension rotation bilateral and is  painful.     Musculoskeletal:     Trigger points in Paraveteral:absent bilaterally  SI joint tenderness:negative right, negative left  SLR:positve right, negative left, sitting      Extremities:     Tremors:None bilaterally upper and lower  Range of motion:pain with internal rotation of hips negative. Intact:Yes  Edema:Normal  Bilateral hand swelling and arthritis     Neurological:     Sensory:normal to light touch bilateral lower extremities. Motor:                           Right Quadriceps5/5          Left Quadriceps5/5           Right Gastrocnemius5/5    Left Gastrocnemius5/5  Right Ant Tibialis5/5  Left Ant Tibialis5/5  Reflexes:    Right Quadriceps reflex2+  Left Quadriceps reflex2+  Right Achilles reflex2+  Left Achilles reflex2+  Gait:antalgic     Dermatology:     Skin:no unusual rashes and no skin lesions     Impression:  Right lower extremity pain with h/o psoriasis   Plan:  Follow up on his low back pain radiating down the right lower extremity with complaints of increased pain. Continues to feel better since his LESI L4-5. Will start patient on Mobic 7.5 mg discussed side effects and reviewed medications list.  OARRS report reviewed 01/2022. Patient encouraged to stay active. Treatment plan discussed with the patient. We discussed with the patient that combining opioids, benzodiazepines, alcohol, illicit drugs or sleep aids increases the risk of respiratory depression including death. We discussed that these medications may cause drowsiness, sedation or dizziness and have counseled the patient not to drive or operate machinery. We have discussed that these medications will be prescribed only by one provider.  We have discussed with the patient about age related risk factors and have thoroughly discussed the importance of taking these medications as prescribed. The patient verbalizes understanding. hetal Acuña M.D.

## 2022-08-11 RX ORDER — PREDNISONE 10 MG/1
10 TABLET ORAL AS NEEDED
COMMUNITY

## 2022-08-11 NOTE — H&P
History and Physical    Patient's Name/Date of Birth: Ammon Bence / 1935 (65 y.o.)    Date: August 11, 2022     Chief Complaint: Decreased vision of the right eye    HPI: Mature right cataract with decreased vision. Risks and complications as well as options and benefits were discussed with the patient and he has elected to proceed with right cataract extraction with intra ocular lens implant. Past Medical History:   Diagnosis Date    Cancer ([address] 2003    prostate-post seed implants 2003    CHF (congestive heart failure) ([facility])     Dr. Percy Bernabe- last seen 6/22    Diverticulosis     GERD (gastroesophageal reflux disease)     Hemorrhoids     Hiatal hernia     Hyperlipidemia     Hypertension     Osteoarthritis     Psoriasis        Past Surgical History:   Procedure Laterality Date    CARDIAC CATHETERIZATION  03/2007    Dr. Percy Bernabe- 6/22    COLONOSCOPY  08/2011    PAIN MANAGEMENT PROCEDURE Right 09/22/2021    LUMBAR EPIDURAL STEROID INJECTION L4-5 RIGHT PARAMEDIAN WITH 80 DEPO performed by Nati Nolan MD at [address] [place] [address]       Prior to Admission medications    Medication Sig Start Date End Date Taking?  Authorizing Provider   predniSONE (DELTASONE) 10 MG tablet Take 10 mg by mouth as needed   Yes Historical Provider, MD   furosemide (LASIX) 20 MG tablet TAKE 1 TABLET BY MOUTH ONCE DAILY 12/16/21   Historical Provider, MD   Cyanocobalamin (VITAMIN B 12) 500 MCG TABS Take by mouth    Historical Provider, MD   Omega-3 Fatty Acids (FISH OIL) 1200 MG CPDR Take by mouth at bedtime Hold pre-op    Historical Provider, MD   ibuprofen (ADVIL;MOTRIN) 200 MG tablet Take 200 mg by mouth 2 times daily as needed for Pain Hold 24 hours pre-op    Historical Provider, MD   meloxicam (MOBIC) 7.5 MG tablet Take 1 tablet by mouth daily 1/7/22 2/6/22  Nati Nolan MD   clobetasol (TEMOVATE) 0.05 % cream  4/22/21   Historical Provider, MD   Ustekinumab (STELARA SC) Inject 1 Syringe into the skin every 3 months    Historical Provider, MD       Patient has no known allergies. Family History   Problem Relation Age of Onset    No Known Problems Mother     Heart Attack Father     Heart Disease Father     No Known Problems Sister     No Known Problems Brother     No Known Problems Maternal Aunt     Heart Attack Maternal Uncle     No Known Problems Paternal Aunt     No Known Problems Paternal Uncle     No Known Problems Maternal Grandmother     No Known Problems Maternal Grandfather     No Known Problems Paternal Grandmother     No Known Problems Paternal Grandfather     No Known Problems Maternal Cousin     No Known Problems Paternal Cousin     Diabetes Grandchild     No Known Problems Sister     Cancer Sister         breast    Heart Disease Sister     Heart Attack Sister     Other Brother         collapsed lung    Cancer Brother         patient doesn't know type    Alzheimer's Disease Brother        Social History     Socioeconomic History    Marital status:      Spouse name: Not on file    Number of children: Not on file    Years of education: Not on file    Highest education level: Not on file   Occupational History    Not on file   Tobacco Use    Smoking status: Former     Packs/day: 0.25     Years: 5.00     Pack years: 1.25     Types: Cigarettes     Quit date: 2000     Years since quittin.6    Smokeless tobacco: Never   Vaping Use    Vaping Use: Never used   Substance and Sexual Activity    Alcohol use:  Yes     Alcohol/week: 21.0 standard drinks     Types: 21 Cans of beer per week    Drug use: No    Sexual activity: Not on file   Other Topics Concern    Not on file   Social History Narrative    Not on file     Social Determinants of Health     Financial Resource Strain: Not on file   Food Insecurity: Not on file   Transportation Needs: Not on file   Physical Activity: Not on file   Stress: Not on file   Social Connections: Not on file   Intimate Partner Violence: Not on file   Housing Stability: Not on file Review of Systems:   CONSTITUTIONAL:  Oriented to person, place and time  EYES:  Mature right cataract with decreased vision effecting reading, driving and all routine home activities. Visual acuity is 20/60  HEENT:  negative  RESPIRATORY:  negative  CARDIOVASCULAR:  negative  GASTROINTESTINAL:  negative  GENITOURINARY:  negative  INTEGUMENT/BREAST:  negative  HEMATOLOGIC/LYMPHATIC:  negative  ALLERGIC/IMMUNOLOGIC:  negative  ENDOCRINE:  negative  MUSCULOSKELETAL:  negative  NEUROLOGICAL:  negative  BEHAVIOR/PSYCH:  negative    Physical Exam:  Vitals:    08/11/22 1024   Weight: 155 lb (70.3 kg)   Height: 5' 7\" (1.702 m)       CONSTITUTIONAL:  awake, alert, cooperative, no apparent distress, and appears stated age  EYES:  Lids and lashes normal, pupils equal, round and reactive to light, extra ocular muscles intact, sclera clear, conjunctiva normal  ENT:  Normocephalic, without obvious abnormality, atraumatic, sinuses nontender on palpation, external ears without lesions, oral pharynx with moist mucus membranes, tonsils without erythema or exudates, gums normal and good dentition.   NECK:  Supple, symmetrical, trachea midline, no adenopathy, thyroid symmetric, not enlarged and no tenderness, skin normal  HEMATOLOGIC/LYMPHATICS:  no cervical lymphadenopathy and no supraclavicular lymphadenopathy  BACK:  Symmetric, no curvature, spinous processes are non-tender on palpation, paraspinous muscles are non-tender on palpation, no costal vertebral tenderness  LUNGS:  No increased work of breathing, good air exchange, clear to auscultation bilaterally, no crackles or wheezing  CARDIOVASCULAR:  Normal apical impulse, regular rate and rhythm, normal S1 and S2, no S3 or S4, and no murmur noted  ABDOMEN:  No scars, normal bowel sounds, soft, non-distended, non-tender, no masses palpated, no hepatosplenomegally  CHEST/BREASTS:  Breasts symmetrical, skin without lesion(s), no nipple retraction or dimpling, no nipple discharge, no masses palpated, no axillary or supraclavicular adenopathy  GENITAL/URINARY:  Deferred  MUSCULOSKELETAL:  There is no redness, warmth, or swelling of the joints. Full range of motion noted. Motor strength is 5 out of 5 all extremities bilaterally. Tone is normal.  NEUROLOGIC:  Awake, alert, oriented to name, place and time. Cranial nerves II-XII are grossly intact. Motor is 5 out of 5 bilaterally. Cerebellar finger to nose, heel to shin intact. Sensory is intact. Babinski down going, Romberg negative, and gait is normal.  SKIN:  no bruising or bleeding, normal skin color, texture, turgor, no redness, warmth, or swelling, and no rashes    Assessment:  Active Problems:    * No active hospital problems. *  Resolved Problems:    * No resolved hospital problems.  *      Plan:  Right cataract extraction with intra ocular lens implant    Electronically signed by Virgel Severance, MD on 8/11/22 at 2:57 PM EDT

## 2022-08-15 ENCOUNTER — ANESTHESIA EVENT (OUTPATIENT)
Dept: OPERATING ROOM | Age: 87
End: 2022-08-15
Payer: MEDICARE

## 2022-08-15 NOTE — ANESTHESIA PRE PROCEDURE
Department of Anesthesiology  Preprocedure Note       Name:  Jessica Ugarte   Age:  80 y.o.  :  1935                                          MRN:  [id number]         Date:  8/15/2022      Surgeon: Oziel Cevallos):  Lizzy Alejandra MD    Procedure: Procedure(s):  RIGHT CATARACT EXTRACTION WITH IOL (WILL USE SUTURE)    Medications prior to admission:   Prior to Admission medications    Medication Sig Start Date End Date Taking? Authorizing Provider   predniSONE (DELTASONE) 10 MG tablet Take 10 mg by mouth as needed   Yes Historical Provider, MD   furosemide (LASIX) 20 MG tablet TAKE 1 TABLET BY MOUTH ONCE DAILY 21   Historical Provider, MD   Cyanocobalamin (VITAMIN B 12) 500 MCG TABS Take by mouth    Historical Provider, MD   Omega-3 Fatty Acids (FISH OIL) 1200 MG CPDR Take by mouth at bedtime Hold pre-op    Historical Provider, MD   ibuprofen (ADVIL;MOTRIN) 200 MG tablet Take 200 mg by mouth 2 times daily as needed for Pain Hold 24 hours pre-op    Historical Provider, MD   meloxicam (MOBIC) 7.5 MG tablet Take 1 tablet by mouth daily 22  Fermin Leon MD   clobetasol (TEMOVATE) 0.05 % cream  21   Historical Provider, MD   Ustekinumab (STELARA SC) Inject 1 Syringe into the skin every 3 months    Historical Provider, MD       Current medications:    No current facility-administered medications for this encounter.      Current Outpatient Medications   Medication Sig Dispense Refill    predniSONE (DELTASONE) 10 MG tablet Take 10 mg by mouth as needed      furosemide (LASIX) 20 MG tablet TAKE 1 TABLET BY MOUTH ONCE DAILY      Cyanocobalamin (VITAMIN B 12) 500 MCG TABS Take by mouth      Omega-3 Fatty Acids (FISH OIL) 1200 MG CPDR Take by mouth at bedtime Hold pre-op      ibuprofen (ADVIL;MOTRIN) 200 MG tablet Take 200 mg by mouth 2 times daily as needed for Pain Hold 24 hours pre-op      meloxicam (MOBIC) 7.5 MG tablet Take 1 tablet by mouth daily 30 tablet 0    clobetasol (TEMOVATE) 0.05 % cream       Ustekinumab (STELARA SC) Inject 1 Syringe into the skin every 3 months         Allergies:  No Known Allergies    Problem List:    Patient Active Problem List   Diagnosis Code    Prostate cancer ([facility] C61    Psoriasis L40.9    Arthritis M19.90    Neuropathy G62.9    Hyperlipidemia E78.5    Lumbar radiculopathy M54.16    Weakness of right leg R29.898    Generalized anxiety disorder F41.1    Psoriatic arthritis ([facility] L40.50    Spinal stenosis of lumbar region without neurogenic claudication M48.061    Lumbar spondylosis M47.816    Lumbar disc disorder M51.9    Chronic pain syndrome G89.4    Arthritis of hand M19.049       Past Medical History:        Diagnosis Date    Cancer [facility])     prostate-post seed implants     CHF (congestive heart failure) ([facility]     Dr. Trinh Rosales- last seen     Diverticulosis     GERD (gastroesophageal reflux disease)     Hemorrhoids     Hiatal hernia     Hyperlipidemia     Hypertension     Osteoarthritis     Psoriasis        Past Surgical History:        Procedure Laterality Date    CARDIAC CATHETERIZATION  2007    Dr. Trinh Rosales-     COLONOSCOPY  2011    PAIN MANAGEMENT PROCEDURE Right 2021    LUMBAR EPIDURAL STEROID INJECTION L4-5 RIGHT PARAMEDIAN WITH 80 DEPO performed by Hannah Peterson MD at [address] [place] [address] History:    Social History     Tobacco Use    Smoking status: Former     Packs/day: 0.25     Years: 5.00     Pack years: 1.25     Types: Cigarettes     Quit date: 2000     Years since quittin.6    Smokeless tobacco: Never   Substance Use Topics    Alcohol use:  Yes     Alcohol/week: 21.0 standard drinks     Types: 21 Cans of beer per week                                Counseling given: Not Answered      Vital Signs (Current):   Vitals:    22 1024   Weight: 155 lb (70.3 kg)   Height: 5' 7\" (1.702 m)                                              BP Readings from Last 3 Encounters:   22 116/74   11/23/21 122/84   10/05/21 122/84       NPO Status:                                                                                 BMI:   Wt Readings from Last 3 Encounters:   01/07/22 162 lb (73.5 kg)   11/23/21 162 lb (73.5 kg)   10/05/21 160 lb (72.6 kg)     Body mass index is 24.28 kg/m². CBC:   Lab Results   Component Value Date/Time    WBC 8.0 02/02/2021 12:36 PM    RBC 4.77 02/02/2021 12:36 PM    HGB 13.1 02/02/2021 12:36 PM    HCT 41.1 02/02/2021 12:36 PM    MCV 86.2 02/02/2021 12:36 PM    RDW 14.0 02/02/2021 12:36 PM     02/02/2021 12:36 PM       CMP:   Lab Results   Component Value Date/Time     05/13/2021 02:45 PM    K 4.4 05/13/2021 02:45 PM     05/13/2021 02:45 PM    CO2 23 05/13/2021 02:45 PM    BUN 16 05/13/2021 02:45 PM    CREATININE 1.1 05/13/2021 02:45 PM    CREATININE 0.9 06/09/2020 12:00 AM    GFRAA >60 05/13/2021 02:45 PM    LABGLOM >60 05/13/2021 02:45 PM    GLUCOSE 111 05/13/2021 02:45 PM    GLUCOSE 99 03/23/2012 08:45 AM    PROT 7.3 05/13/2021 02:45 PM    CALCIUM 10.0 05/13/2021 02:45 PM    BILITOT 0.6 05/13/2021 02:45 PM    ALKPHOS 78 05/13/2021 02:45 PM    AST 18 05/13/2021 02:45 PM    ALT 9 05/13/2021 02:45 PM       POC Tests: No results for input(s): POCGLU, POCNA, POCK, POCCL, POCBUN, POCHEMO, POCHCT in the last 72 hours. Coags: No results found for: PROTIME, INR, APTT    HCG (If Applicable): No results found for: PREGTESTUR, PREGSERUM, HCG, HCGQUANT     ABGs: No results found for: PHART, PO2ART, YJW4IZJ, RVK5DME, BEART, T5ZEXQOK     Type & Screen (If Applicable):  No results found for: LABABO, LABRH    Drug/Infectious Status (If Applicable):  No results found for: HIV, HEPCAB    COVID-19 Screening (If Applicable): No results found for: COVID19        Anesthesia Evaluation  Patient summary reviewed  Airway: Mallampati: III  TM distance: >3 FB   Neck ROM: full  Mouth opening: > = 3 FB   Dental:      Comment: Several teeth remain in poor condition. Patient states he needs them removed. Denies any loose teeth. Pulmonary: breath sounds clear to auscultation                            ROS comment: Former - 1.25 pack years  Quit Smokin00       Cardiovascular:    (+) hypertension:, CHF:, hyperlipidemia        Rhythm: regular  Rate: normal                    Neuro/Psych:   (+) neuromuscular disease ( Spinal stenosis of lumbar region without neurogenic claudication, Chronic pain syndrome ):, psychiatric history:depression/anxiety             GI/Hepatic/Renal:   (+) hiatal hernia, GERD:,          ROS comment: Diverticulosis. Endo/Other:    (+) : arthritis: rheumatoid and OA., malignancy/cancer ( Prostate cancer). Abdominal:             Vascular: negative vascular ROS. Other Findings:           Anesthesia Plan      MAC     ASA 3       Induction: intravenous. Anesthetic plan and risks discussed with patient. Plan discussed with CRNA.                     Dina Saucedo MD   8/15/2022

## 2022-08-16 ENCOUNTER — HOSPITAL ENCOUNTER (OUTPATIENT)
Age: 87
Setting detail: OUTPATIENT SURGERY
Discharge: HOME | End: 2022-08-16
Attending: OPHTHALMOLOGY | Admitting: OPHTHALMOLOGY
Payer: MEDICARE

## 2022-08-16 ENCOUNTER — ANESTHESIA (OUTPATIENT)
Dept: OPERATING ROOM | Age: 87
End: 2022-08-16
Payer: MEDICARE

## 2022-08-16 VITALS
BODY MASS INDEX: 24.17 KG/M2 | RESPIRATION RATE: 14 BRPM | DIASTOLIC BLOOD PRESSURE: 64 MMHG | HEART RATE: 57 BPM | HEIGHT: 67 IN | SYSTOLIC BLOOD PRESSURE: 141 MMHG | OXYGEN SATURATION: 97 % | WEIGHT: 154 LBS | TEMPERATURE: 97 F

## 2022-08-16 PROBLEM — H26.9 RIGHT CATARACT: Status: ACTIVE | Noted: 2022-08-16

## 2022-08-16 PROCEDURE — 6360000002 HC RX W HCPCS: Performed by: NURSE ANESTHETIST, CERTIFIED REGISTERED

## 2022-08-16 PROCEDURE — 3600000003 HC SURGERY LEVEL 3 BASE: Performed by: OPHTHALMOLOGY

## 2022-08-16 PROCEDURE — 2500000003 HC RX 250 WO HCPCS: Performed by: OPHTHALMOLOGY

## 2022-08-16 PROCEDURE — 3700000000 HC ANESTHESIA ATTENDED CARE: Performed by: OPHTHALMOLOGY

## 2022-08-16 PROCEDURE — 2580000003 HC RX 258: Performed by: ANESTHESIOLOGY

## 2022-08-16 PROCEDURE — 6370000000 HC RX 637 (ALT 250 FOR IP): Performed by: OPHTHALMOLOGY

## 2022-08-16 PROCEDURE — 7100000011 HC PHASE II RECOVERY - ADDTL 15 MIN: Performed by: OPHTHALMOLOGY

## 2022-08-16 PROCEDURE — 2709999900 HC NON-CHARGEABLE SUPPLY: Performed by: OPHTHALMOLOGY

## 2022-08-16 PROCEDURE — V2632 POST CHMBR INTRAOCULAR LENS: HCPCS | Performed by: OPHTHALMOLOGY

## 2022-08-16 PROCEDURE — 7100000010 HC PHASE II RECOVERY - FIRST 15 MIN: Performed by: OPHTHALMOLOGY

## 2022-08-16 DEVICE — LENS INTOCU +22.5 DIOPT A CONSTANT 118.8 L13MM DIA6MM 0DEG: Type: IMPLANTABLE DEVICE | Site: EYE | Status: FUNCTIONAL

## 2022-08-16 RX ORDER — PHENYLEPHRINE HYDROCHLORIDE 100 MG/ML
1 SOLUTION/ DROPS OPHTHALMIC PRN
Status: DISCONTINUED | OUTPATIENT
Start: 2022-08-16 | End: 2022-08-16 | Stop reason: HOSPADM

## 2022-08-16 RX ORDER — BALANCED SALT SOLUTION 6.4; .75; .48; .3; 3.9; 1.7 MG/ML; MG/ML; MG/ML; MG/ML; MG/ML; MG/ML
SOLUTION OPHTHALMIC PRN
Status: DISCONTINUED | OUTPATIENT
Start: 2022-08-16 | End: 2022-08-16 | Stop reason: ALTCHOICE

## 2022-08-16 RX ORDER — SODIUM CHLORIDE, SODIUM LACTATE, POTASSIUM CHLORIDE, CALCIUM CHLORIDE 600; 310; 30; 20 MG/100ML; MG/100ML; MG/100ML; MG/100ML
INJECTION, SOLUTION INTRAVENOUS CONTINUOUS
Status: DISCONTINUED | OUTPATIENT
Start: 2022-08-16 | End: 2022-08-16 | Stop reason: HOSPADM

## 2022-08-16 RX ORDER — PROPARACAINE HYDROCHLORIDE 5 MG/ML
1 SOLUTION/ DROPS OPHTHALMIC
Status: COMPLETED | OUTPATIENT
Start: 2022-08-16 | End: 2022-08-16

## 2022-08-16 RX ORDER — TETRACAINE HYDROCHLORIDE 5 MG/ML
SOLUTION OPHTHALMIC PRN
Status: DISCONTINUED | OUTPATIENT
Start: 2022-08-16 | End: 2022-08-16 | Stop reason: ALTCHOICE

## 2022-08-16 RX ORDER — FLURBIPROFEN SODIUM 0.3 MG/ML
1 SOLUTION/ DROPS OPHTHALMIC
Status: COMPLETED | OUTPATIENT
Start: 2022-08-16 | End: 2022-08-16

## 2022-08-16 RX ORDER — CYCLOPENTOLATE HYDROCHLORIDE 10 MG/ML
1 SOLUTION/ DROPS OPHTHALMIC
Status: COMPLETED | OUTPATIENT
Start: 2022-08-16 | End: 2022-08-16

## 2022-08-16 RX ORDER — FENTANYL CITRATE 50 UG/ML
INJECTION, SOLUTION INTRAMUSCULAR; INTRAVENOUS PRN
Status: DISCONTINUED | OUTPATIENT
Start: 2022-08-16 | End: 2022-08-16 | Stop reason: SDUPTHER

## 2022-08-16 RX ORDER — PHENYLEPHRINE HCL 2.5 %
1 DROPS OPHTHALMIC (EYE)
Status: COMPLETED | OUTPATIENT
Start: 2022-08-16 | End: 2022-08-16

## 2022-08-16 RX ORDER — TETRACAINE HYDROCHLORIDE 5 MG/ML
1 SOLUTION OPHTHALMIC ONCE
Status: COMPLETED | OUTPATIENT
Start: 2022-08-16 | End: 2022-08-16

## 2022-08-16 RX ADMIN — PROPARACAINE HYDROCHLORIDE 1 DROP: 5 SOLUTION/ DROPS OPHTHALMIC at 06:50

## 2022-08-16 RX ADMIN — FLURBIPROFEN SODIUM 1 DROP: 0.3 SOLUTION/ DROPS OPHTHALMIC at 07:02

## 2022-08-16 RX ADMIN — PROPARACAINE HYDROCHLORIDE 1 DROP: 5 SOLUTION/ DROPS OPHTHALMIC at 06:55

## 2022-08-16 RX ADMIN — SODIUM CHLORIDE, POTASSIUM CHLORIDE, SODIUM LACTATE AND CALCIUM CHLORIDE: 600; 310; 30; 20 INJECTION, SOLUTION INTRAVENOUS at 07:01

## 2022-08-16 RX ADMIN — PHENYLEPHRINE HYDROCHLORIDE 1 DROP: 25 SOLUTION/ DROPS OPHTHALMIC at 06:55

## 2022-08-16 RX ADMIN — CYCLOPENTOLATE HYDROCHLORIDE 1 DROP: 10 SOLUTION/ DROPS OPHTHALMIC at 07:02

## 2022-08-16 RX ADMIN — PHENYLEPHRINE HYDROCHLORIDE 1 DROP: 25 SOLUTION/ DROPS OPHTHALMIC at 07:02

## 2022-08-16 RX ADMIN — FENTANYL CITRATE 50 MCG: 50 INJECTION INTRAMUSCULAR; INTRAVENOUS at 07:36

## 2022-08-16 RX ADMIN — PROPARACAINE HYDROCHLORIDE 1 DROP: 5 SOLUTION/ DROPS OPHTHALMIC at 07:02

## 2022-08-16 RX ADMIN — FENTANYL CITRATE 50 MCG: 50 INJECTION INTRAMUSCULAR; INTRAVENOUS at 07:35

## 2022-08-16 RX ADMIN — FLURBIPROFEN SODIUM 1 DROP: 0.3 SOLUTION/ DROPS OPHTHALMIC at 06:50

## 2022-08-16 RX ADMIN — PHENYLEPHRINE HYDROCHLORIDE 1 DROP: 25 SOLUTION/ DROPS OPHTHALMIC at 06:50

## 2022-08-16 RX ADMIN — TETRACAINE HYDROCHLORIDE 1 DROP: 25 LIQUID OPHTHALMIC at 07:02

## 2022-08-16 RX ADMIN — CYCLOPENTOLATE HYDROCHLORIDE 1 DROP: 10 SOLUTION/ DROPS OPHTHALMIC at 06:55

## 2022-08-16 RX ADMIN — CYCLOPENTOLATE HYDROCHLORIDE 1 DROP: 10 SOLUTION/ DROPS OPHTHALMIC at 06:50

## 2022-08-16 RX ADMIN — FLURBIPROFEN SODIUM 1 DROP: 0.3 SOLUTION/ DROPS OPHTHALMIC at 06:55

## 2022-08-16 ASSESSMENT — PAIN - FUNCTIONAL ASSESSMENT: PAIN_FUNCTIONAL_ASSESSMENT: 0-10

## 2022-08-16 NOTE — ANESTHESIA POSTPROCEDURE EVALUATION
Department of Anesthesiology  Postprocedure Note    Patient: Dina Mann  MRN: [id number]  YOB: 1935  Date of evaluation: 8/16/2022      Procedure Summary     Date: 08/16/22 Room / Location: [address] [place] [address]    Anesthesia Start: 4779 Anesthesia Stop: 1945    Procedure: RIGHT CATARACT EXTRACTION WITH IOL (WILL USE SUTURE) (Right: Eye) Diagnosis:       Combined forms of age-related cataract of right eye      (Combined forms of age-related cataract of right eye [H25.811])    Surgeons: Maria Elena Rose MD Responsible Provider: Luiza Lopez MD    Anesthesia Type: MAC ASA Status: 3          Anesthesia Type: MAC    Naz Phase I: Naz Score: 10    Naz Phase II: Naz Score: 10      Anesthesia Post Evaluation    Patient location during evaluation: bedside  Patient participation: complete - patient participated  Level of consciousness: awake  Pain score: 0  Airway patency: patent  Nausea & Vomiting: no nausea and no vomiting  Complications: no  Cardiovascular status: hemodynamically stable  Respiratory status: acceptable  Hydration status: euvolemic

## 2022-08-16 NOTE — DISCHARGE INSTRUCTIONS
Cataract Post-Op Instructions  Mara Agarwal M.D.  [phone]    Dr. Madison Rae has used the most modern surgical techniques during your cataract extraction; therefore, there are few restrictions. You may move your eye in any direction, watch TV, read, cook dinner, clean house, and go up and down steps. There are no physical restrictions, though you should avoid extreme exertion, do not drive day of surgery, and avoid swimming for three weeks. We make every attempt to provide a pain-free procedure. At times you may notice a dull headache or even a sharp pain. This is normal.  Should the discomfort persist, please call the office. If you see any flashes of light, floaters, or a black cloud over the eyes, call the office immediately. The vision in the operated eye will be blurry at first. Be patient. Your vision will improve dramatically over the next few days. You will see glares and halos around lights for the first day or so. This is a result of the dilating drops used for the surgery. You may also notice red or pink tinged vision. This is normal and will go away in a few days. Your eye will continue to heal over the next two to three weeks. At the end of the healing time you will see Dr. Madison Rae in the office to check your vision and determine your new glasses prescription. Resume regular diet and medications (unless otherwise instructed by your doctor). Medicine drops will be necessary to ensure as rapid healing as possible. These include:    Vigamox one drop three times a day  [place]anac one drop three times a day   Pred Forte one drop three times a day    Your post-op visit will be ____8/17______ at ______8:00 am___ at the office. Date                 Time    Your satisfaction is our primary concern. If you have any questions, please contact the office. It is our pleasure to be your choice in eye care.     ***DO NOT RUB OR TOUCH THE OPERATIVE EYE***      If any problems occur or if you have any further questions, please call your doctor as soon as possible. If you find that you cannot reach your doctor but feel that your condition needs a doctors attention go to an emergency room. Nausea and Vomiting After Surgery: Care Instructions  Your Care Instructions     After you've had surgery, you may feel sick to your stomach (nauseated) or you may vomit. Sometimes anesthesia can make you feel sick. It's a common side effect and often doesn't last long. Pain also can make you feel sick or vomit. After the anesthesia wears off, you may feel pain from the incision (cut). That pain could then upset your stomach. Taking pain medicine can also make you feelsick to your stomach. Whatever the cause, you may get medicine that can help. There are also somethings you can do at home to prevent nausea and feel better. The doctor has checked you carefully, but problems can develop later. If you notice any problems or new symptoms, get medical treatment right away. Follow-up care is a key part of your treatment and safety. Be sure to make and go to all appointments, and call your doctor if you are having problems. It's also a good idea to know your test results and keep alist of the medicines you take. How can you care for yourself at home? Be safe with medicines. Read and follow all instructions on the label. If the doctor gave you a prescription medicine for pain, take it as prescribed. If you are not taking a prescription pain medicine, ask your doctor if you can take an over-the-counter medicine. Take your pain medicine as soon as you have pain. It works better if you take it before the pain gets bad. Call your doctor if you have any problems with your medicine. Rest in bed until you feel better. To prevent dehydration, drink plenty of fluids. Choose water and other clear liquids until you feel better.  If you have drainage from the wound. If your doctor told you how to care for your incision, follow your doctor's instructions. If you did not get instructions, follow this general advice:  Wash around the incision with clean water 2 times a day. Don't use hydrogen peroxide or alcohol, which can slow healing. When should you call for help? Call your doctor now or seek immediate medical care if:    You have signs that your infection is getting worse, such as: Increased pain, swelling, warmth, or redness in the area. Red streaks leading from the area. Pus draining from the wound. A new or higher fever. Watch closely for changes in your health, and be sure to contact your doctor ifyou have any problems. Where can you learn more? Go to https://[employer]. org and sign in to your [employer] account. Enter C340 in the [employer] box to learn more about \"Infection After Surgery: Care Instructions. \"     If you do not have an account, please click on the \"Sign Up Now\" link. Current as of: January 20, 2022               Content Version: 13.3  © 1838-4309 [employer]. Care instructions adapted under license by [facility] ([facility]). If you have questions about a medical condition or this instruction, always ask your healthcare professional. [address]  any warranty or liability for your use of this information.

## 2022-08-16 NOTE — OP NOTE
PREOPERATIVE DIAGNOSIS:  Right Cataract    POSTOPERATIVE DIAGNOSIS:  Right Cataract    PROCEDURE:  Right phacoemulsification with intraocular lens implant. ANESTHESIA:  Local Mac    ESTIMATED BLOOD LOSS:  Minimal    COMPLICATIONS:  None    DESCRIPTION OF PROCEDURE:  The patient was brought into the operating room. The operative eye was marked, which was the right eye. The right eye was then prepped with a full-strength Betadine preparation. The periorbital area was copiously washed with Betadine. The lashes were washed with Betadine. Dilute Betadine was then also put in the inferior and superior fornices and left in place for approximately a minute or 2. This was then irrigated with sterile water. The face was then wiped and the preparation was repeated 1 more time. The drape was then placed over the operative eye with the sticky adhesive placed at the lid margins so that it draped the lashes out of the operative field superiorly and inferiorly, as well as isolated the meibomian glands behind the drape. This cleared the operative field of any meibomian gland secretions and eyelashes. Next, a lid speculum was positioned in the right eye. A super sharp blade was used to make a side-port incision at the 2 o'clock position. A clear corneal incision was fashioned over the 12 o;clock meridian with a 2.3mm keratome at the limbus. Healon was used to reform the anterior chamber. A continuous tear anterior capsulotomy was then performed with a bent needle cystotome. Hydrodissection was performed by irrigating with balanced salt solution through a syringe underneath the anterior capsular flap to loosen and allow free rotation of the lens nucleus. Phacoemulsification was used and the entire lens nucleus was removed. The I A unit was instilled in the eye, and the remaining cortex was removed. Healon was used to separate the anterior and posterior capsular flaps.   The PCBOO 22.5 diopter lens was then instilled into the capsular bag and dialed to 3 and 9 o'clock positions. Healon was removed from in front of and behind the lens. The lens was found to be well centered, well positioned in the bag and stable. The wound was checked and found to be airtight and watertight. The lid speculum was removed and the drape was removed. The patient was brought to the recovery room in excellent condition, given postoperative instructions, and discharge in excellent condition.   Operative Note      Patient: Harini Millan  YOB: 1935  MRN: [id number]    Date of Procedure: 8/16/2022    Pre-Op Diagnosis: Combined forms of age-related cataract of right eye [H25.811]    Post-Op Diagnosis: Same       Procedure(s):  RIGHT CATARACT EXTRACTION WITH IOL (WILL USE SUTURE)    Surgeon(s):  Florentino Gale MD    Assistant:   * No surgical staff found *    Anesthesia: Monitor Anesthesia Care    Estimated Blood Loss (mL): Minimal    Complications: None    Specimens:   * No specimens in log *    Implants:  * No implants in log *      Drains: * No LDAs found *    Findings: Right cataract    Detailed Description of Procedure:   Right cataract extraction with intra ocular lens implant    Electronically signed by Harvinder Peoples MD on 8/16/2022 at 7:35 AM

## 2022-08-16 NOTE — H&P
Update History & Physical    The patient's History and Physical of 8 / 11 / 2022 was reviewed with the patient and there were no significant changes. I examined the patient and there were no significant changes from the previous History and Physical.    Plan: The risk, benefits, expected outcome, and alternative to the recommended procedure have been discussed with the patient. Patient understands and wants to proceed with the procedure.     Electronically signed by Michele Eric MD on 8/16/22 at 7:34 AM EDT

## 2023-06-01 ENCOUNTER — HOSPITAL ENCOUNTER (EMERGENCY)
Age: 88
Discharge: HOME | End: 2023-06-01
Attending: EMERGENCY MEDICINE
Payer: MEDICARE

## 2023-06-01 ENCOUNTER — APPOINTMENT (OUTPATIENT)
Dept: GENERAL RADIOLOGY | Age: 88
End: 2023-06-01
Payer: MEDICARE

## 2023-06-01 VITALS
DIASTOLIC BLOOD PRESSURE: 87 MMHG | HEART RATE: 80 BPM | OXYGEN SATURATION: 100 % | RESPIRATION RATE: 18 BRPM | TEMPERATURE: 98 F | SYSTOLIC BLOOD PRESSURE: 141 MMHG

## 2023-06-01 DIAGNOSIS — S80.12XA CONTUSION OF LEFT LOWER EXTREMITY, INITIAL ENCOUNTER: Primary | ICD-10-CM

## 2023-06-01 PROCEDURE — 73590 X-RAY EXAM OF LOWER LEG: CPT

## 2023-06-01 PROCEDURE — 99283 EMERGENCY DEPT VISIT LOW MDM: CPT

## 2023-06-01 ASSESSMENT — ENCOUNTER SYMPTOMS
WHEEZING: 0
BACK PAIN: 0
NAUSEA: 0
DIARRHEA: 0
SORE THROAT: 0
CHEST TIGHTNESS: 0
ABDOMINAL PAIN: 0
VOMITING: 0
COUGH: 0
SHORTNESS OF BREATH: 0

## 2023-06-01 ASSESSMENT — PAIN - FUNCTIONAL ASSESSMENT: PAIN_FUNCTIONAL_ASSESSMENT: 0-10

## 2023-06-01 ASSESSMENT — PAIN SCALES - GENERAL: PAINLEVEL_OUTOF10: 5

## 2023-06-01 NOTE — ED PROVIDER NOTES
Disposition:  Patient's disposition: Discharge to home  Patient's condition is stable.          Aubree Mena DO  06/01/23 [id number]

## 2023-08-15 NOTE — TELEPHONE ENCOUNTER
pts daughter called inquiring about a referral placed for dr Brayan Vaz back in 05/2021   Advised pt that dr Brayan Vaz is not accepting any new pts at this time and that office was informed of this   Names given to her of other rheumatologists in this are  Appreciation verbalized
9

## 2024-05-28 ENCOUNTER — HOSPITAL ENCOUNTER (EMERGENCY)
Age: 89
Discharge: HOME | End: 2024-05-28
Attending: EMERGENCY MEDICINE
Payer: MEDICARE

## 2024-05-28 VITALS
SYSTOLIC BLOOD PRESSURE: 139 MMHG | HEART RATE: 78 BPM | WEIGHT: 154 LBS | RESPIRATION RATE: 18 BRPM | DIASTOLIC BLOOD PRESSURE: 91 MMHG | OXYGEN SATURATION: 98 % | BODY MASS INDEX: 24.12 KG/M2 | TEMPERATURE: 98.2 F

## 2024-05-28 DIAGNOSIS — T14.8XXA OPEN WOUND: Primary | ICD-10-CM

## 2024-05-28 DIAGNOSIS — T14.8XXA PUNCTURE WOUND: ICD-10-CM

## 2024-05-28 PROCEDURE — 6360000002 HC RX W HCPCS

## 2024-05-28 PROCEDURE — 90471 IMMUNIZATION ADMIN: CPT

## 2024-05-28 PROCEDURE — 99284 EMERGENCY DEPT VISIT MOD MDM: CPT

## 2024-05-28 PROCEDURE — 90714 TD VACC NO PRESV 7 YRS+ IM: CPT

## 2024-05-28 RX ORDER — CEPHALEXIN 500 MG/1
500 CAPSULE ORAL 3 TIMES DAILY
Qty: 30 CAPSULE | Refills: 0 | Status: SHIPPED | OUTPATIENT
Start: 2024-05-28 | End: 2024-06-07

## 2024-05-28 RX ORDER — CIPROFLOXACIN 500 MG/1
500 TABLET, FILM COATED ORAL 2 TIMES DAILY
Qty: 14 TABLET | Refills: 0 | Status: SHIPPED | OUTPATIENT
Start: 2024-05-28 | End: 2024-06-04

## 2024-05-28 RX ADMIN — CLOSTRIDIUM TETANI TOXOID ANTIGEN (FORMALDEHYDE INACTIVATED) AND CORYNEBACTERIUM DIPHTHERIAE TOXOID ANTIGEN (FORMALDEHYDE INACTIVATED) 0.5 ML: 5; 2 INJECTION, SUSPENSION INTRAMUSCULAR at 11:25

## 2024-05-28 NOTE — ED PROVIDER NOTES
[facility] EMERGENCY DEPARTMENT  EMERGENCY DEPARTMENT ENCOUNTER      Pt Name: Aurelio Cunningham  MRN: [id number]  Birthdate 1935  Date of evaluation: 5/28/2024  Provider: Thaddeus Mack MD  PCP: Nathen Leonard MD  Note Started: 10:49 AM EDT 5/28/24    CHIEF COMPLAINT       Chief Complaint   Patient presents with    Wound Check     Right leg abrasion from cement right shin and also stepped on a nail-occurred 2 days ago       HISTORY OF PRESENT ILLNESS: 1 or more Elements   History From: Patient  Limitations to history : None    Aurelio Cunningham is a 89 y.o. male who presents with complaints of right lower extremity anterior shin laceration sustained 2 days ago.  Patient reports he was performing manual labor with cement and sustained an abrasion to his right lower extremity, reports he has kept the area clean with gauze for the past day or so he has noticed it is now red swollen and tender.  He reports it was oozing but bleeding is now stopped.  Also reports he stepped on a nail 2 days ago and a separate incident.  Currently denies nausea, vomiting, fever, chills, chest pain, shortness of breath, dizziness, weakness, numbness, tingling extremities, dysuria, diarrhea, constipation.  Denies trismus, drooling,    Nursing Notes were all reviewed and agreed with or any disagreements were addressed in the HPI.    REVIEW OF SYSTEMS :    Positives and Pertinent negatives as per HPI.     PAST MEDICAL HISTORY/Chronic Conditions Affecting Care    has a past medical history of Cancer (HCC) (2003), CHF (congestive heart failure) ([facility]), Diverticulosis, GERD (gastroesophageal reflux disease), Hemorrhoids, Hiatal hernia, Hyperlipidemia, Hypertension, Osteoarthritis, and Psoriasis.     SURGICAL HISTORY     Past Surgical History:   Procedure Laterality Date    CARDIAC CATHETERIZATION  03/2007    Dr. Barbosa- 6/22    COLONOSCOPY  08/2011    INTRACAPSULAR CATARACT EXTRACTION Right 8/16/2022

## (undated) DEVICE — ENCORE® LATEX MICRO SIZE 8.5, STERILE LATEX POWDER-FREE SURGICAL GLOVE: Brand: ENCORE

## (undated) DEVICE — SURGICAL PROCEDURE PACK CATRCT LT EYE BASIC CUST ST JOS LF

## (undated) DEVICE — 3 ML SYRINGE LUER-LOCK TIP: Brand: MONOJECT

## (undated) DEVICE — SOLUTION IV IRRIG WATER 1000ML POUR BRL 2F7114

## (undated) DEVICE — SOLUTION SCRB 32OZ 7.5% POVIDONE IOD BTL GENTLE EFFECTIVE

## (undated) DEVICE — BANDAGE ADH W1XL3IN NAT FAB WVN FLX DURABLE N ADH PD SEAL

## (undated) DEVICE — Device

## (undated) DEVICE — NEEDLE HYPO 18GA L1.5IN PNK POLYPR HUB S STL THN WALL FILL

## (undated) DEVICE — 6 ML SYRINGE LUER-LOCK TIP: Brand: MONOJECT

## (undated) DEVICE — SURGICAL PREP KIT DRY EYE TY STRL

## (undated) DEVICE — APPLICATOR MEDICATED 10.5 CC SOLUTION HI LT ORNG CHLORAPREP

## (undated) DEVICE — GAUZE,SPONGE,4"X4",12PLY,STERILE,LF,2'S: Brand: MEDLINE

## (undated) DEVICE — NEEDLE HYPO 27GA L1.25IN GRY POLYPR HUB S STL REG BVL STR

## (undated) DEVICE — SUTURE ETHLN 10-0 L12IN NONABSORBABLE BLK CS140-8 L6.5MM 9033G

## (undated) DEVICE — PREP TRAY 10X5X2: Brand: MEDLINE INDUSTRIES, INC.

## (undated) DEVICE — GLOVE ORANGE PI 7 1/2   MSG9075

## (undated) DEVICE — TOWEL,OR,DSP,ST,BLUE,STD,6/PK,12PK/CS: Brand: MEDLINE

## (undated) DEVICE — STERILE POLYISOPRENE POWDER-FREE SURGICAL GLOVES: Brand: PROTEXIS

## (undated) DEVICE — 3M™ RED DOT™ MONITORING ELECTRODE WITH FOAM TAPE AND STICKY GEL 2560, 50/BAG, 20/CASE, 72/PLT: Brand: RED DOT™

## (undated) DEVICE — SOLUTION IV IRRIG POUR BRL 0.9% SODIUM CHL 2F7124

## (undated) DEVICE — 12 ML SYRINGE,LUER-LOCK TIP: Brand: MONOJECT